# Patient Record
Sex: MALE | Race: WHITE | NOT HISPANIC OR LATINO | Employment: OTHER | URBAN - METROPOLITAN AREA
[De-identification: names, ages, dates, MRNs, and addresses within clinical notes are randomized per-mention and may not be internally consistent; named-entity substitution may affect disease eponyms.]

---

## 2017-01-06 ENCOUNTER — TELEPHONE (OUTPATIENT)
Dept: UROLOGY | Facility: CLINIC | Age: 72
End: 2017-01-06

## 2017-01-06 NOTE — TELEPHONE ENCOUNTER
----- Message from Vania Martinez sent at 1/5/2017  4:18 PM CST -----  Contact: self/[phone]  Patient would like to speak with you about a upcoming procedure he has.  Please call after 2:30.  Please advise

## 2017-01-09 ENCOUNTER — TELEPHONE (OUTPATIENT)
Dept: UROLOGY | Facility: CLINIC | Age: 72
End: 2017-01-09

## 2017-01-09 NOTE — TELEPHONE ENCOUNTER
----- Message from Joana Garzon sent at 1/9/2017  3:47 PM CST -----  Contact: [phone] self  Patient is returning Marleni's call.

## 2017-01-09 NOTE — TELEPHONE ENCOUNTER
----- Message from Vania Martinez sent at 1/6/2017  3:36 PM CST -----  Contact: self/[phone]  Patient is returning your call.  Please advise

## 2017-01-10 ENCOUNTER — ANESTHESIA EVENT (OUTPATIENT)
Dept: SURGERY | Facility: HOSPITAL | Age: 72
End: 2017-01-10
Payer: MEDICARE

## 2017-01-10 ENCOUNTER — HOSPITAL ENCOUNTER (OUTPATIENT)
Dept: PREADMISSION TESTING | Facility: HOSPITAL | Age: 72
Discharge: HOME | End: 2017-01-10
Attending: UROLOGY
Payer: MEDICARE

## 2017-01-10 ENCOUNTER — HOSPITAL ENCOUNTER (OUTPATIENT)
Dept: CARDIOLOGY | Facility: HOSPITAL | Age: 72
Discharge: HOME | End: 2017-01-10
Attending: UROLOGY
Payer: MEDICARE

## 2017-01-10 ENCOUNTER — HOSPITAL ENCOUNTER (OUTPATIENT)
Dept: RADIOLOGY | Facility: HOSPITAL | Age: 72
Discharge: HOME | End: 2017-01-10
Attending: UROLOGY
Payer: MEDICARE

## 2017-01-10 VITALS
RESPIRATION RATE: 18 BRPM | BODY MASS INDEX: 25.2 KG/M2 | SYSTOLIC BLOOD PRESSURE: 133 MMHG | WEIGHT: 180 LBS | DIASTOLIC BLOOD PRESSURE: 85 MMHG | OXYGEN SATURATION: 97 % | HEART RATE: 66 BPM | HEIGHT: 71 IN

## 2017-01-10 DIAGNOSIS — N20.0 NEPHROLITHIASIS: ICD-10-CM

## 2017-01-10 PROCEDURE — 93005 ELECTROCARDIOGRAM TRACING: CPT

## 2017-01-10 PROCEDURE — 71020 XR CHEST PA AND LATERAL: CPT | Mod: TC

## 2017-01-10 PROCEDURE — 71020 XR CHEST PA AND LATERAL: CPT | Mod: 26,,, | Performed by: RADIOLOGY

## 2017-01-10 RX ORDER — SODIUM CHLORIDE 9 MG/ML
INJECTION, SOLUTION INTRAVENOUS CONTINUOUS
Status: CANCELLED | OUTPATIENT
Start: 2017-01-10

## 2017-01-10 RX ORDER — LIDOCAINE HYDROCHLORIDE 10 MG/ML
1 INJECTION, SOLUTION EPIDURAL; INFILTRATION; INTRACAUDAL; PERINEURAL ONCE
Status: CANCELLED | OUTPATIENT
Start: 2017-01-10 | End: 2017-01-10

## 2017-01-10 NOTE — DISCHARGE INSTRUCTIONS
Your surgery is scheduled for 1/25/17.    Please report to Outpatient Surgery Intake Office on the 2nd FLOOR at 6:00 a.m.          INSTRUCTIONS IMPORTANT!!!  ¨ Do not eat or drink after 12 midnight-including water. OK to brush teeth, no   gum, candy or mints!    ¨ Take only these medicines with a small swallow of water-morning of surgery: verapamil         ____  No powder, lotions or creams to surgical area.  ____  Please remove all jewelry, including piercings and leave at home.  ____  No money or valuables needed. Please leave at home.  ____  Please bring any documents given by your doctor.  ____  If going home the same day, arrange for a ride home. You will not be able to             drive if Anesthesia was used.  ____  Wear loose fitting clothing. Allow for dressings, bandages.  ____  Stop Aspirin, Ibuprofen, Motrin and Aleve at least 3-5 days before surgery, unless otherwise instructed by your doctor, or the nurse.   You MAY use Tylenol/acetaminophen until day of surgery.  ____  Wash the surgical area with Hibiclens the night before surgery, and again the             morning of surgery.  Be sure to rinse hibiclens off completely (if instructed by   nurse).  ____  If you take diabetic medication, do not take am of surgery unless instructed by Doctor.  ____  Call MD for temperature above 101 degrees.  ____ Stop taking any Fish Oil supplement or any Vitamins that contain Vitamin E at least 5 days prior to surgery.  ____ Do Not wear your contact lenses the day of your procedure.  You may wear your glasses.        I have read or had read and explained to me, and understand the above information.  Additional comments or instructions:  For additional questions call [phone]     Take a Hibiclens shower twice a day for 3 days prior to surgery, including the morning of surgery.   Gargle with Listerine twice a day for 2 days prior to surgery, including the morning of surgery.      Pre-Op Bathing Instructions    Before  surgery, you can play an important role in your own health.    Because skin is not sterile, we need to be sure that your skin is as free of germs as possible. By following the instructions below, you can reduce the number of germs on your skin before surgery.    IMPORTANT: You will need to shower with a special soap called Hibiclens*, available at any pharmacy.  If you are allergic to Chlorhexidine (the antiseptic in Hibiclens), use an antibacterial soap such as Dial Soap for your preoperative shower.  You will shower with Hibiclens both the night before your surgery and the morning of your surgery.  Do not use Hibiclens on the head, face or genitals to avoid injury to those areas.    STEP #1: THE NIGHT BEFORE YOUR SURGERY     1. Do not shave the area of your body where your surgery will be performed.  2. Shower and wash your hair and body as usual with your normal soap and shampoo.  3. Rinse your hair and body thoroughly after you shower to remove all soap residue.  4. With your hand, apply one packet of Hibiclens soap to the surgical site.   5. Wash the site gently for five (5) minutes. Do not scrub your skin too hard.   6. Do not wash with your regular soap after Hibiclens is used.  7. Rinse your body thoroughly.  8. Pat yourself dry with a clean, soft towel.  9. Do not use lotion, cream, or powder.  10. Wear clean clothes.    STEP #2: THE MORNING OF YOUR SURGERY     1. Repeat Step #1.    * Not to be used by people allergic to Chlorhexidine.            Anesthesia: General Anesthesia  Youre due to have surgery. During surgery, youll be given medication called anesthesia. (It is also called anesthetic.) This will keep you comfortable and pain-free. Your anesthesia provider will use general anesthesia. This sheet tells you more about it.  What is general anesthesia?     You are watched continuously during your procedure by the anesthesia provider   General anesthesia puts you into a state like deep sleep. It goes  into the bloodstream (IV anesthetics), into the lungs (gas anesthetics), or both. You feel nothing during the procedure. You will not remember it. During the procedure, the anesthesia provider monitors you continuously. He or she checks your heart rate and rhythm, blood pressure, breathing, and blood oxygen.  · IV Anesthetics. IV anesthetics are given through an IV line in your arm. Theyre often given first. This is so you are asleep before a gas anesthetic is started. Some kinds of IV anesthetics relieve pain. Others relax you. Your doctor will decide which kind is best in your case.  · Gas Anesthetics. Gas anesthetics are breathed into the lungs. They are often used to keep you asleep. They can be given through a facemask or a tube placed in your larynx or trachea (breathing tube).  ¨ If you have a facemask, your anesthesia provider will most likely place it over your nose and mouth while youre still awake. Youll breathe oxygen through the mask as your IV anesthetic is started. Gas anesthetic may be added through the mask.  ¨ If you have a tube in the larynx or trachea, it will be inserted into your throat after youre asleep.  Anesthesia tools and medications  You will likely have:  · IV anesthetics. These are put into an IV line into your bloodstream.  · Gas anesthetics. You breathe these anesthetics into your lungs, where they pass into your bloodstream.  · Pulse oximeter. This is a small clip that is attached to the end of your finger. This measures your blood oxygen level.  · Electrocardiography leads (electrodes). These are small sticky pads that are placed on your chest. They record your heart rate and rhythm.  · Blood pressure cuff. This reads your blood pressure.  Risks and possible complications  General anesthesia has some risks. These include:  · Breathing problems  · Nausea and vomiting  · Sore throat or hoarseness (usually temporary)  · Allergic reaction to the anesthetic  · Irregular heartbeat  (rare)  · Cardiac arrest (rare)   Anesthesia safety  · Follow all instructions you are given for how long not to eat or drink before your procedure.  · Be sure your doctor knows what medications and drugs you take. This includes over-the-counter medications, herbs, supplements, alcohol or other drugs. You will be asked when those were last taken.  · Have an adult family member or friend drive you home after the procedure.  · For the first 24 hours after your surgery:  ¨ Do not drive or use heavy equipment.  ¨ Have a trusted family member or spouse make important decisions or sign documents.  ¨ Avoid alcohol.  ¨ Have a responsible adult stay with you. He or she can watch for problems and help keep you safe.  © 1938-5142 [employer]. [address] [place] [address], [place] [address]. All rights reserved. This information is not intended as a substitute for professional medical care. Always follow your healthcare professional's instructions.        Treating Kidney Stones: Percutaneous Nephrolithotomy    Percutaneous nephrolithotomy may be done before, after, or instead of other treatments. If you need this procedure, your doctor will discuss its risks and possible complications. You will be told how to prepare. And you will be told about anesthesia that will keep you pain-free during treatment.     An instrument inserted through a viewing tube cracks the stone.    Nephrolithotomy with incision  Percutaneous nephrolithotomy removes larger stones through a small incision in your side. Your doctor places a viewing tube through your incision. The stone is sighted, shattered with a special device if needed, and removed. Afterward, youll briefly have a small soft tube in your incision. This tube carries urine away from your kidney and out of your body.     Pieces of stone are plucked or sucked out through the incision.   Your recovery  You may spend 1 day or 3 days in the hospital. The tube in your side will be  removed during or shortly after your hospital stay. A follow-up visit in 3 months will ensure that your stone is gone. Later visits will help your doctor spot new stones if any form.  When to call your doctor  Contact your doctor right away if you have:  · Sudden pain or flank pain  · A fever over 100.4°F (38°C)  · Nausea that lasts for days  · Heavy bleeding when you urinate or through your drainage tube  · Swelling or redness around your incision   © 8714-5869 [employer]. [address] [place] [address], [place] [address]. All rights reserved. This information is not intended as a substitute for professional medical care. Always follow your healthcare professional's instructions.

## 2017-01-10 NOTE — IP AVS SNAPSHOT
[facility]  [address]  [place] [address]  Phone: [phone]           Patient Discharge Instructions    Our goal is to set you up for success. This packet includes information on your condition, medications, and your home care. It will help you to care for yourself so you don't get sicker.     Please ask your nurse if you have any questions.        There are many details to remember when preparing for your surgery. Here is what you will need to do, please ask your nurse if there are more specific instructions and if you have any questions:    1. 24 hours before procedure Do not smoke or drink alcoholic beverages 24 hours prior to your procedure    2. Eating before procedure Do not eat or drink anything 8 hours before your procedure - this includes gum, mints, and candy.     3. Day of procedure Please remove all jewelry for the procedure. If you wear contact lenses, dentures, hearing aids or glasses, bring a container to put them in during your surgery and give to a family member for safekeeping.  If your doctor has scheduled you for an overnight stay, bring a small overnight bag with any personal items that you need.    4. After procedure Make arrangements in advance for transportation home by a responsible adult. It is not safe to drive a vehicle during the 24 hours following surgery.     PLEASE NOTE: You may be contacted the day before your surgery to confirm your surgery date and arrival time. The Surgery schedule has many variables which may affect the time of your surgery case. Family members should be available if your surgery time changes.                [facility]  Unless otherwise directed by your provider, please contact [facility], our nurse care line that is available for 24/7 assistance.     [phone] (toll-free)    Registered nurses in the [facility] provide clinical advisement, health education, appointment booking, and other advisory services.                     ** Verify the list of medication(s) below is accurate and up to date. Carry this with you in case of emergency. If your medications have changed, please notify your healthcare provider.             Medication List      TAKE these medications        Additional Info                      glipiZIDE 5 MG tablet   Commonly known as:  GLUCOTROL   Refills:  0   Dose:  5 mg    Instructions:  Take 5 mg by mouth daily with breakfast.     Begin Date    AM    Noon    PM    Bedtime       hydrocodone-acetaminophen 5-325mg 5-325 mg per tablet   Commonly known as:  NORCO   Quantity:  30 tablet   Refills:  0   Dose:  1 tablet    Instructions:  Take 1 tablet by mouth every 6 (six) hours as needed for Pain.     Begin Date    AM    Noon    PM    Bedtime       metformin 1000 MG tablet   Commonly known as:  GLUCOPHAGE   Refills:  0      Begin Date    AM    Noon    PM    Bedtime       ONE DAILY MULTIVITAMIN per tablet   Refills:  0   Dose:  1 tablet   Generic drug:  multivitamin    Instructions:  Take 1 tablet by mouth once daily.     Begin Date    AM    Noon    PM    Bedtime       pioglitazone 45 MG tablet   Commonly known as:  ACTOS   Refills:  0   Dose:  45 mg    Instructions:  Take 45 mg by mouth once daily.     Begin Date    AM    Noon    PM    Bedtime       pravastatin 40 MG tablet   Commonly known as:  PRAVACHOL   Refills:  0      Begin Date    AM    Noon    PM    Bedtime       PREVNAR 13 (PF) 0.5 mL Syrg   Refills:  0   Generic drug:  pneumoc 13-sukhdev conj-dip cr(PF)    Instructions:  TO BE ADMINISTERED BY PHARMACIST FOR IMMUNIZATION     Begin Date    AM    Noon    PM    Bedtime       verapamil 120 MG CR tablet   Commonly known as:  CALAN-SR   Quantity:  90 tablet   Refills:  3   Dose:  120 mg    Instructions:  Take 1 tablet (120 mg total) by mouth once daily.     Begin Date    AM    Noon    PM    Bedtime       warfarin 5 MG tablet   Commonly known as:  COUMADIN   Quantity:  90 tablet   Refills:  3    Instructions:   TAKE ONE TABLET BY MOUTH ONCE DAILY     Begin Date    AM    Noon    PM    Bedtime                  Please bring to all follow up appointments:    1. A copy of your discharge instructions.  2. All medicines you are currently taking in their original bottles.  3. Identification and insurance card.    Please arrive 15 minutes ahead of scheduled appointment time.    Please call 24 hours in advance if you must reschedule your appointment and/or time.        Your Scheduled Appointments     Henry 10, 2017  9:00 AM CST   Pre-Admit Testing Visit with [facility]    [address] [place] [address]   [phone]            Jan 25, 2017  8:00 AM CST   Ir Dosc with [facility] IR[facility]   [facility]    [address]  [place] [id number] [phone]            May 09, 2017  8:00 AM CDT   Follow Up with Omar Lantigua MD   [facility] ([address]  [address] [place] [id number] [phone]              Your Future Surgeries/Procedures     Jan 25, 2017   Surgery with Dosc Diagnostic Provider   [facility]    [address]  [place] [address] [phone]            Jan 25, 2017   Surgery with Glenn Schuster MD   [facility]    [address] [place] [id number] [phone]                  Discharge Instructions       Your surgery is scheduled for 1/25/17.    Please report to Outpatient Surgery Intake Office on the 2nd FLOOR at 6:00 a.m.          INSTRUCTIONS IMPORTANT!!!  ¨ Do not eat or drink after 12 midnight-including water. OK to brush teeth, no   gum, candy or mints!    ¨ Take only these medicines with a small swallow of water-morning of surgery: verapamil         ____  No powder, lotions or creams to surgical area.  ____  Please remove all jewelry, including  piercings and leave at home.  ____  No money or valuables needed. Please leave at home.  ____  Please bring any documents given by your doctor.  ____  If going home the same day, arrange for a ride home. You will not be able to             drive if Anesthesia was used.  ____  Wear loose fitting clothing. Allow for dressings, bandages.  ____  Stop Aspirin, Ibuprofen, Motrin and Aleve at least 3-5 days before surgery, unless otherwise instructed by your doctor, or the nurse.   You MAY use Tylenol/acetaminophen until day of surgery.  ____  Wash the surgical area with Hibiclens the night before surgery, and again the             morning of surgery.  Be sure to rinse hibiclens off completely (if instructed by   nurse).  ____  If you take diabetic medication, do not take am of surgery unless instructed by Doctor.  ____  Call MD for temperature above 101 degrees.  ____ Stop taking any Fish Oil supplement or any Vitamins that contain Vitamin E at least 5 days prior to surgery.  ____ Do Not wear your contact lenses the day of your procedure.  You may wear your glasses.        I have read or had read and explained to me, and understand the above information.  Additional comments or instructions:  For additional questions call [phone]     Take a Hibiclens shower twice a day for 3 days prior to surgery, including the morning of surgery.   Gargle with Listerine twice a day for 2 days prior to surgery, including the morning of surgery.      Pre-Op Bathing Instructions    Before surgery, you can play an important role in your own health.    Because skin is not sterile, we need to be sure that your skin is as free of germs as possible. By following the instructions below, you can reduce the number of germs on your skin before surgery.    IMPORTANT: You will need to shower with a special soap called Hibiclens*, available at any pharmacy.  If you are allergic to Chlorhexidine (the antiseptic in Hibiclens), use an antibacterial  soap such as Dial Soap for your preoperative shower.  You will shower with Hibiclens both the night before your surgery and the morning of your surgery.  Do not use Hibiclens on the head, face or genitals to avoid injury to those areas.    STEP #1: THE NIGHT BEFORE YOUR SURGERY     1. Do not shave the area of your body where your surgery will be performed.  2. Shower and wash your hair and body as usual with your normal soap and shampoo.  3. Rinse your hair and body thoroughly after you shower to remove all soap residue.  4. With your hand, apply one packet of Hibiclens soap to the surgical site.   5. Wash the site gently for five (5) minutes. Do not scrub your skin too hard.   6. Do not wash with your regular soap after Hibiclens is used.  7. Rinse your body thoroughly.  8. Pat yourself dry with a clean, soft towel.  9. Do not use lotion, cream, or powder.  10. Wear clean clothes.    STEP #2: THE MORNING OF YOUR SURGERY     1. Repeat Step #1.    * Not to be used by people allergic to Chlorhexidine.            Anesthesia: General Anesthesia  Youre due to have surgery. During surgery, youll be given medication called anesthesia. (It is also called anesthetic.) This will keep you comfortable and pain-free. Your anesthesia provider will use general anesthesia. This sheet tells you more about it.  What is general anesthesia?     You are watched continuously during your procedure by the anesthesia provider   General anesthesia puts you into a state like deep sleep. It goes into the bloodstream (IV anesthetics), into the lungs (gas anesthetics), or both. You feel nothing during the procedure. You will not remember it. During the procedure, the anesthesia provider monitors you continuously. He or she checks your heart rate and rhythm, blood pressure, breathing, and blood oxygen.  · IV Anesthetics. IV anesthetics are given through an IV line in your arm. Theyre often given first. This is so you are asleep before a gas  anesthetic is started. Some kinds of IV anesthetics relieve pain. Others relax you. Your doctor will decide which kind is best in your case.  · Gas Anesthetics. Gas anesthetics are breathed into the lungs. They are often used to keep you asleep. They can be given through a facemask or a tube placed in your larynx or trachea (breathing tube).  ¨ If you have a facemask, your anesthesia provider will most likely place it over your nose and mouth while youre still awake. Youll breathe oxygen through the mask as your IV anesthetic is started. Gas anesthetic may be added through the mask.  ¨ If you have a tube in the larynx or trachea, it will be inserted into your throat after youre asleep.  Anesthesia tools and medications  You will likely have:  · IV anesthetics. These are put into an IV line into your bloodstream.  · Gas anesthetics. You breathe these anesthetics into your lungs, where they pass into your bloodstream.  · Pulse oximeter. This is a small clip that is attached to the end of your finger. This measures your blood oxygen level.  · Electrocardiography leads (electrodes). These are small sticky pads that are placed on your chest. They record your heart rate and rhythm.  · Blood pressure cuff. This reads your blood pressure.  Risks and possible complications  General anesthesia has some risks. These include:  · Breathing problems  · Nausea and vomiting  · Sore throat or hoarseness (usually temporary)  · Allergic reaction to the anesthetic  · Irregular heartbeat (rare)  · Cardiac arrest (rare)   Anesthesia safety  · Follow all instructions you are given for how long not to eat or drink before your procedure.  · Be sure your doctor knows what medications and drugs you take. This includes over-the-counter medications, herbs, supplements, alcohol or other drugs. You will be asked when those were last taken.  · Have an adult family member or friend drive you home after the procedure.  · For the first 24 hours  after your surgery:  ¨ Do not drive or use heavy equipment.  ¨ Have a trusted family member or spouse make important decisions or sign documents.  ¨ Avoid alcohol.  ¨ Have a responsible adult stay with you. He or she can watch for problems and help keep you safe.  © 8233-3430 [employer]. [address] [place] [address]. All rights reserved. This information is not intended as a substitute for professional medical care. Always follow your healthcare professional's instructions.        Treating Kidney Stones: Percutaneous Nephrolithotomy    Percutaneous nephrolithotomy may be done before, after, or instead of other treatments. If you need this procedure, your doctor will discuss its risks and possible complications. You will be told how to prepare. And you will be told about anesthesia that will keep you pain-free during treatment.     An instrument inserted through a viewing tube cracks the stone.    Nephrolithotomy with incision  Percutaneous nephrolithotomy removes larger stones through a small incision in your side. Your doctor places a viewing tube through your incision. The stone is sighted, shattered with a special device if needed, and removed. Afterward, youll briefly have a small soft tube in your incision. This tube carries urine away from your kidney and out of your body.     Pieces of stone are plucked or sucked out through the incision.   Your recovery  You may spend 1 day or 3 days in the hospital. The tube in your side will be removed during or shortly after your hospital stay. A follow-up visit in 3 months will ensure that your stone is gone. Later visits will help your doctor spot new stones if any form.  When to call your doctor  Contact your doctor right away if you have:  · Sudden pain or flank pain  · A fever over 100.4°F (38°C)  · Nausea that lasts for days  · Heavy bleeding when you urinate or through your drainage tube  · Swelling or redness around your incision   ©  9001-8928 [employer]. [address] [place] [address]. All rights reserved. This information is not intended as a substitute for professional medical care. Always follow your healthcare professional's instructions.            Admission Information     Date & Time Provider Department CSN    1/10/2017  9:00 AM Glenn Schuster MD [facility] [id number]      Care Providers     Provider Role Specialty Primary office phone    Glenn Schuster MD Attending Provider Urology [phone]      Recent Lab Values        9/27/2011                          11:40 AM           A1C 9.1 (H)                       Allergies as of 1/10/2017        Reactions    Shellfish Containing Products Other (See Comments)    GOUT      Advance Directives     An advance directive is a document which, in the event you are no longer able to make decisions for yourself, tells your healthcare team what kind of treatment you do or do not want to receive, or who you would like to make those decisions for you.  If you do not currently have an advance directive, [facility] encourages you to create one.  For more information call:  [phone]), [phone] ([phone]),  or log on to [email or web address].        Smoking Cessation     If you would like to quit smoking:   You may be eligible for free services if you are a [place] resident and started smoking cigarettes before September 1, 1988.  Call the Smoking Cessation Trust (SCT) toll free at (687) 380-9356 or (881) 724-5047.   Call 4-800-QUIT-NOW if you do not meet the above criteria.            Language Assistance Services     ATTENTION: Language assistance services are available, free of charge. Please call [phone].      ATENCIÓN: Si habla español, tiene a harding disposición servicios gratuitos de asistencia lingüística. Llame al [phone].     CHÚ Ý: N?u b?n nói Ti?ng Vi?t, có các d?ch v? h? tr? ngôn ng? mi?n phí dành cho b?n.  G?i s? [phone].        Coumadin Discharge Instructions                         Diabetes Discharge Instructions                                    [facility] complies with applicable Federal civil rights laws and does not discriminate on the basis of race, color, national origin, age, disability, or sex.

## 2017-01-10 NOTE — ANESTHESIA PREPROCEDURE EVALUATION
01/10/2017     Cesar Oliveira is a 71 y.o., male is scheduled for right nephrostomy tube and percutaneous nephrolithotripsy and ureteroscopic stone extraction under [facility] on 2/8/2017.    Past Surgical History   Procedure Laterality Date    Throat surgery  2000     for sleep apnea     Uvulectomy  2000    Extracorporeal shock wave lithotripsy  05/2016         [facility] Anesthesia Evaluation    I have reviewed the Patient Summary Reports.    I have reviewed the Nursing Notes.   I have reviewed the Medications.     Review of Systems  Anesthesia Hx:  Hx of Anesthetic complications  History of prior surgery of interest to airway management or planning: Previous anesthesia: General Airway issues documented on chart review include GETA  Denies Family Hx of Anesthesia complications.  Personal Hx of Anesthesia complications (2016-05-18 rocuronium hang-over (marked incomplete reversal 1h after 10mg increment =>delay to leave OR; no Sugammadex available yet))   Social:  Smoker, Alcohol Use  Tobacco Use: Active smoker of cigarette, 1 pack per day, Smoking Cessation discussion.   Hematology/Oncology:        Hematology Comments: On coumadin; per Dr. Lantigua pt will stop coumadin 1/20/2017. Pt will restart post procedure per Dr. JESSICA Schuster. Pt verbalizes understanding   EENT/Dental:EENT/Dental Normal   Cardiovascular:   Exercise tolerance: poor Hypertension, well controlled Dysrhythmias (on coumadin; will hold 1/20/2017) atrial fibrillation Denies Angina.     hyperlipidemia MARSH ECG has been reviewed.    Pulmonary:   Denies Shortness of breath.    Renal/:   renal calculi Denies pain; no hematuria   Hepatic/GI:   GERD, well controlled    Neurological:  Neurology Normal    Endocrine:   Diabetes, well controlled, type 2  Diabetes, Type 2 Diabetes , controlled by oral hypoglycemics. , most recent HgA1c value was 7.8 on doesn't  "recall date in 2016.                Physical Exam  General:  Well nourished    Airway/Jaw/Neck:  Airway Findings: Mouth Opening: Normal Tongue: Normal  General Airway Assessment: Adult  Mallampati: II  Improves to II with phonation.  TM Distance: Normal, at least 6 cm        Eyes/Ears/Nose:  EYES/EARS/NOSE FINDINGS: Normal   Dental:  Dental Findings: Periodontal disease, Mild, upper partial dentures, lower partial dentures   Chest/Lungs:  Chest/Lungs Clear    Heart/Vascular:  Heart Findings: Rhythm: Irregularly Irregular  Sounds: Normal, Quiet  Heart murmur: negative    Abdomen:  Abdomen Findings: Normal      Mental Status:  Mental Status Findings: Normal      Cardiac treadmill stress test 4/2016 (report in media section EPIC)  Per report:  "poor physical performance; normal stress test."      Chemistry        Component Value Date/Time     01/10/2017 0734    K 4.3 01/10/2017 0734     01/10/2017 0734    CO2 26 01/10/2017 0734    BUN 11 01/10/2017 0734    CREATININE 1.1 01/10/2017 0734     (H) 01/10/2017 0734        Component Value Date/Time    CALCIUM 9.5 01/10/2017 0734    ALKPHOS 96 01/10/2017 0734    AST 16 01/10/2017 0734    ALT 13 01/10/2017 0734    BILITOT 0.5 01/10/2017 0734        Lab Results   Component Value Date    WBC 9.67 01/10/2017    HGB 17.2 01/10/2017    HCT 51.3 01/10/2017    MCV 95 01/10/2017     01/10/2017         Anesthesia Plan  Type of Anesthesia, risks & benefits discussed:  Anesthesia Type:  general  Patient's Preference:   Intra-op Monitoring Plan:   Intra-op Monitoring Plan Comments:   Post Op Pain Control Plan:   Post Op Pain Control Plan Comments:   Induction:   IV  Beta Blocker:  Patient is not currently on a Beta-Blocker (No further documentation required).       Informed Consent: Patient understands risks and agrees with Anesthesia plan.  Questions answered. Anesthesia consent signed with patient.  ASA Score: 3     Day of Surgery Review of History & Physical: " I have interviewed and examined the patient. I have reviewed the patient's H&P dated:  There are no significant changes.  H&P update referred to the surgeon.     Anesthesia Plan Notes:           Ready For Surgery From Anesthesia Perspective.

## 2017-01-10 NOTE — IP AVS SNAPSHOT
[facility]  [address]  [place] [address]  Phone: [phone]           I have received a copy of my After Visit Summary and discharge instructions from [facility].    INSTRUCTIONS RECEIVED AND UNDERSTOOD BY:                     Patient/Patient Representative: ________________________________________________________________     Date/Time: ________________________________________________________________                     Instructions Given By: ________________________________________________________________     Date/Time: ________________________________________________________________

## 2017-01-16 ENCOUNTER — TELEPHONE (OUTPATIENT)
Dept: UROLOGY | Facility: CLINIC | Age: 72
End: 2017-01-16

## 2017-01-16 NOTE — TELEPHONE ENCOUNTER
----- Message from Yanique Esqueda sent at 1/16/2017  2:57 PM CST -----  Patient no. [phone]     Patient would like to speak to you regarding his operation.   Please call.

## 2017-01-17 NOTE — TELEPHONE ENCOUNTER
----- Message from Jerrica Braden MA sent at 1/17/2017 10:02 AM CST -----  Contact: [phone]  Patient had recent surgery, states passing blood this morning, want's to know if he need to come in. Please advise

## 2017-02-03 ENCOUNTER — TELEPHONE (OUTPATIENT)
Dept: UROLOGY | Facility: CLINIC | Age: 72
End: 2017-02-03

## 2017-02-03 NOTE — TELEPHONE ENCOUNTER
Called numerous times today and this afternoon.  Patient did not answer. Message left to call back on Monday.

## 2017-02-03 NOTE — TELEPHONE ENCOUNTER
----- Message from Nayely Ledesma sent at 2/3/2017  2:16 PM CST -----  Contact: [phone]/self  Pt states he was speaking to Dr. Schuster and the phone disconnected he would like a call back.  Please advise

## 2017-02-06 ENCOUNTER — TELEPHONE (OUTPATIENT)
Dept: UROLOGY | Facility: CLINIC | Age: 72
End: 2017-02-06

## 2017-02-06 NOTE — TELEPHONE ENCOUNTER
Called again and left a message.  Does he have a specific question for me?  I do not have anything significant or specific to discuss, but if he keeps calling, I will keep trying to reach him.

## 2017-02-06 NOTE — TELEPHONE ENCOUNTER
----- Message from Yanique Esqueda sent at 2/6/2017 12:26 PM CST -----  Patient no.  [phone]    While patient was speaking to Dr. Schuster on Friday about his surgery, the call disconnected.  He would like to speak to Dr. Schuster.

## 2017-02-07 ENCOUNTER — ANESTHESIA EVENT (OUTPATIENT)
Dept: SURGERY | Facility: HOSPITAL | Age: 72
End: 2017-02-07
Payer: MEDICARE

## 2017-02-07 NOTE — ANESTHESIA PREPROCEDURE EVALUATION
02/07/2017     Cesar Oliveira is a 71 y.o., male is scheduled for right nephrostomy tube and percutaneous nephrolithotripsy and ureteroscopic stone extraction under MAC/GETA on 2/8/2017.    Past Surgical History   Procedure Laterality Date    Throat surgery  2000     for sleep apnea     Uvulectomy  2000    Extracorporeal shock wave lithotripsy  05/2016         [facility] Anesthesia Evaluation    I have reviewed the Patient Summary Reports.    I have reviewed the Nursing Notes.   I have reviewed the Medications.     Review of Systems  Anesthesia Hx:  Hx of Anesthetic complications  History of prior surgery of interest to airway management or planning: Previous anesthesia: General Airway issues documented on chart review include GETA  Denies Family Hx of Anesthesia complications.  Personal Hx of Anesthesia complications (2016-05-18 rocuronium hang-over (marked incomplete reversal 1h after 10mg increment =>delay to leave OR; no Sugammadex available yet))   Social:  Smoker, Alcohol Use  Tobacco Use: Active smoker of cigarette, 1 pack per day, Smoking Cessation discussion.   Hematology/Oncology:        Hematology Comments: On coumadin; per Dr. Lantigua pt will stop coumadin 1/20/2017. Pt will restart post procedure per Dr. JESSICA Schuster. Pt verbalizes understanding   EENT/Dental:EENT/Dental Normal   Cardiovascular:   Exercise tolerance: poor Hypertension, well controlled Dysrhythmias (on coumadin; will hold 1/20/2017) atrial fibrillation Denies Angina.     hyperlipidemia MARSH ECG has been reviewed.    Pulmonary:   Denies Shortness of breath.    Renal/:   renal calculi Denies pain; no hematuria   Hepatic/GI:   GERD, well controlled    Neurological:  Neurology Normal    Endocrine:   Diabetes, well controlled, type 2  Diabetes, Type 2 Diabetes , controlled by oral hypoglycemics. , most recent HgA1c value was 7.8 on doesn't  "recall date in 2016.                Physical Exam  General:  Well nourished    Airway/Jaw/Neck:  Airway Findings: Mouth Opening: Normal Tongue: Normal  General Airway Assessment: Adult  Mallampati: II  Improves to II with phonation.  TM Distance: Normal, at least 6 cm        Eyes/Ears/Nose:  EYES/EARS/NOSE FINDINGS: Normal   Dental:  Dental Findings: Periodontal disease, Mild, upper partial dentures, lower partial dentures   Chest/Lungs:  Chest/Lungs Clear    Heart/Vascular:  Heart Findings: Rhythm: Irregularly Irregular  Sounds: Normal, Quiet  Heart murmur: negative    Abdomen:  Abdomen Findings: Normal      Mental Status:  Mental Status Findings: Normal      Cardiac treadmill stress test 4/2016 (report in media section EPIC)  Per report:  "poor physical performance; normal stress test."      Chemistry        Component Value Date/Time     01/10/2017 0734    K 4.3 01/10/2017 0734     01/10/2017 0734    CO2 26 01/10/2017 0734    BUN 11 01/10/2017 0734    CREATININE 1.1 01/10/2017 0734     (H) 01/10/2017 0734        Component Value Date/Time    CALCIUM 9.5 01/10/2017 0734    ALKPHOS 96 01/10/2017 0734    AST 16 01/10/2017 0734    ALT 13 01/10/2017 0734    BILITOT 0.5 01/10/2017 0734        Lab Results   Component Value Date    WBC 9.67 01/10/2017    HGB 17.2 01/10/2017    HCT 51.3 01/10/2017    MCV 95 01/10/2017     01/10/2017         Anesthesia Plan  Type of Anesthesia, risks & benefits discussed:  Anesthesia Type:  general  Patient's Preference:   Intra-op Monitoring Plan:   Intra-op Monitoring Plan Comments:   Post Op Pain Control Plan:   Post Op Pain Control Plan Comments:   Induction:   IV  Beta Blocker:  Patient is not currently on a Beta-Blocker (No further documentation required).       Informed Consent: Patient understands risks and agrees with Anesthesia plan.  Questions answered. Anesthesia consent signed with patient.  ASA Score: 3     Day of Surgery Review of History & Physical: " I have interviewed and examined the patient. I have reviewed the patient's H&P dated:  There are no significant changes.  H&P update referred to the surgeon.     Anesthesia Plan Notes:           Ready For Surgery From Anesthesia Perspective.

## 2017-02-08 ENCOUNTER — SURGERY (OUTPATIENT)
Age: 72
End: 2017-02-08

## 2017-02-08 ENCOUNTER — ANESTHESIA (OUTPATIENT)
Dept: SURGERY | Facility: HOSPITAL | Age: 72
End: 2017-02-08
Payer: MEDICARE

## 2017-02-08 VITALS — HEART RATE: 91 BPM | DIASTOLIC BLOOD PRESSURE: 55 MMHG | SYSTOLIC BLOOD PRESSURE: 94 MMHG | OXYGEN SATURATION: 99 %

## 2017-02-08 PROCEDURE — 63600175 PHARM REV CODE 636 W HCPCS: Performed by: UROLOGY

## 2017-02-08 PROCEDURE — 63600175 PHARM REV CODE 636 W HCPCS: Performed by: NURSE ANESTHETIST, CERTIFIED REGISTERED

## 2017-02-08 PROCEDURE — 25000003 PHARM REV CODE 250: Performed by: NURSE ANESTHETIST, CERTIFIED REGISTERED

## 2017-02-08 PROCEDURE — 25000003 PHARM REV CODE 250: Performed by: UROLOGY

## 2017-02-08 RX ORDER — PROPOFOL 10 MG/ML
VIAL (ML) INTRAVENOUS
Status: DISCONTINUED | OUTPATIENT
Start: 2017-02-08 | End: 2017-02-08

## 2017-02-08 RX ORDER — SUCCINYLCHOLINE CHLORIDE 20 MG/ML
INJECTION INTRAMUSCULAR; INTRAVENOUS
Status: DISCONTINUED | OUTPATIENT
Start: 2017-02-08 | End: 2017-02-08

## 2017-02-08 RX ORDER — ONDANSETRON HYDROCHLORIDE 2 MG/ML
INJECTION, SOLUTION INTRAMUSCULAR; INTRAVENOUS
Status: DISCONTINUED | OUTPATIENT
Start: 2017-02-08 | End: 2017-02-08

## 2017-02-08 RX ORDER — LIDOCAINE HCL/PF 100 MG/5ML
SYRINGE (ML) INTRAVENOUS
Status: DISCONTINUED | OUTPATIENT
Start: 2017-02-08 | End: 2017-02-08

## 2017-02-08 RX ORDER — PROPOFOL 10 MG/ML
VIAL (ML) INTRAVENOUS CONTINUOUS PRN
Status: DISCONTINUED | OUTPATIENT
Start: 2017-02-08 | End: 2017-02-08

## 2017-02-08 RX ORDER — PHENYLEPHRINE HYDROCHLORIDE 10 MG/ML
INJECTION INTRAVENOUS
Status: DISCONTINUED | OUTPATIENT
Start: 2017-02-08 | End: 2017-02-08

## 2017-02-08 RX ORDER — ESMOLOL HYDROCHLORIDE 10 MG/ML
INJECTION INTRAVENOUS
Status: DISCONTINUED | OUTPATIENT
Start: 2017-02-08 | End: 2017-02-08

## 2017-02-08 RX ORDER — FENTANYL CITRATE 50 UG/ML
INJECTION, SOLUTION INTRAMUSCULAR; INTRAVENOUS
Status: DISCONTINUED | OUTPATIENT
Start: 2017-02-08 | End: 2017-02-08

## 2017-02-08 RX ORDER — MIDAZOLAM HYDROCHLORIDE 1 MG/ML
INJECTION INTRAMUSCULAR; INTRAVENOUS
Status: DISCONTINUED | OUTPATIENT
Start: 2017-02-08 | End: 2017-02-08

## 2017-02-08 RX ORDER — ROCURONIUM BROMIDE 10 MG/ML
INJECTION, SOLUTION INTRAVENOUS
Status: DISCONTINUED | OUTPATIENT
Start: 2017-02-08 | End: 2017-02-08

## 2017-02-08 RX ORDER — SODIUM CHLORIDE, SODIUM LACTATE, POTASSIUM CHLORIDE, CALCIUM CHLORIDE 600; 310; 30; 20 MG/100ML; MG/100ML; MG/100ML; MG/100ML
INJECTION, SOLUTION INTRAVENOUS CONTINUOUS PRN
Status: DISCONTINUED | OUTPATIENT
Start: 2017-02-08 | End: 2017-02-08

## 2017-02-08 RX ADMIN — LIDOCAINE HYDROCHLORIDE 60 MG: 20 INJECTION, SOLUTION INTRAVENOUS at 10:02

## 2017-02-08 RX ADMIN — FENTANYL CITRATE 50 MCG: 50 INJECTION, SOLUTION INTRAMUSCULAR; INTRAVENOUS at 11:02

## 2017-02-08 RX ADMIN — SODIUM CHLORIDE, SODIUM LACTATE, POTASSIUM CHLORIDE, AND CALCIUM CHLORIDE: .6; .31; .03; .02 INJECTION, SOLUTION INTRAVENOUS at 08:02

## 2017-02-08 RX ADMIN — PROPOFOL 150 MCG/KG/MIN: 10 INJECTION, EMULSION INTRAVENOUS at 08:02

## 2017-02-08 RX ADMIN — ESMOLOL HYDROCHLORIDE 20 MG: 10 INJECTION, SOLUTION INTRAVENOUS at 11:02

## 2017-02-08 RX ADMIN — PHENYLEPHRINE HYDROCHLORIDE 100 MCG: 10 INJECTION INTRAVENOUS at 11:02

## 2017-02-08 RX ADMIN — PROPOFOL 110 MG: 10 INJECTION, EMULSION INTRAVENOUS at 10:02

## 2017-02-08 RX ADMIN — SODIUM CHLORIDE, SODIUM LACTATE, POTASSIUM CHLORIDE, AND CALCIUM CHLORIDE: .6; .31; .03; .02 INJECTION, SOLUTION INTRAVENOUS at 09:02

## 2017-02-08 RX ADMIN — PROPOFOL 60 MG: 10 INJECTION, EMULSION INTRAVENOUS at 08:02

## 2017-02-08 RX ADMIN — ROCURONIUM BROMIDE 5 MG: 10 INJECTION, SOLUTION INTRAVENOUS at 10:02

## 2017-02-08 RX ADMIN — CEFTRIAXONE SODIUM 1 G: 1 INJECTION, POWDER, FOR SOLUTION INTRAMUSCULAR; INTRAVENOUS at 08:02

## 2017-02-08 RX ADMIN — IOHEXOL 100 ML: 300 INJECTION, SOLUTION INTRAVENOUS at 10:02

## 2017-02-08 RX ADMIN — GENTAMICIN SULFATE 164 MG: 40 INJECTION, SOLUTION INTRAMUSCULAR; INTRAVENOUS at 08:02

## 2017-02-08 RX ADMIN — LIDOCAINE HYDROCHLORIDE 50 MG: 20 INJECTION, SOLUTION INTRAVENOUS at 08:02

## 2017-02-08 RX ADMIN — FENTANYL CITRATE 50 MCG: 50 INJECTION, SOLUTION INTRAMUSCULAR; INTRAVENOUS at 10:02

## 2017-02-08 RX ADMIN — MIDAZOLAM HYDROCHLORIDE 2 MG: 1 INJECTION, SOLUTION INTRAMUSCULAR; INTRAVENOUS at 08:02

## 2017-02-08 RX ADMIN — ONDANSETRON 4 MG: 2 INJECTION, SOLUTION INTRAMUSCULAR; INTRAVENOUS at 11:02

## 2017-02-08 RX ADMIN — PROPOFOL 50 MG: 10 INJECTION, EMULSION INTRAVENOUS at 11:02

## 2017-02-08 RX ADMIN — PHENYLEPHRINE HYDROCHLORIDE 200 MCG: 10 INJECTION INTRAVENOUS at 11:02

## 2017-02-08 RX ADMIN — SUCCINYLCHOLINE CHLORIDE 100 MG: 20 INJECTION, SOLUTION INTRAMUSCULAR; INTRAVENOUS at 10:02

## 2017-02-08 RX ADMIN — ONDANSETRON 4 MG: 2 INJECTION, SOLUTION INTRAMUSCULAR; INTRAVENOUS at 10:02

## 2017-02-08 NOTE — TRANSFER OF CARE
"Anesthesia Transfer of Care Note    Patient: Cesar Oliveira    Procedure(s) Performed: Procedure(s) (LRB):  NEPHROLITHOTRIPSY-PERCUTANEOUS (Right)  EXTRACTION-STONE-URETEROSCOPY (Right)  PYELOGRAM-ANTEGRADE    Patient location: PACU    Anesthesia Type: general    Transport from OR: Transported from OR on 6-10 L/min O2 by face mask with adequate spontaneous ventilation    Post pain: adequate analgesia    Post assessment: no apparent anesthetic complications    Post vital signs: stable    Level of consciousness: awake    Nausea/Vomiting: no nausea/vomiting    Complications: none          Last vitals:   Visit Vitals    BP (!) 144/96 (BP Location: Left arm, Patient Position: Lying, BP Method: Automatic)    Pulse 84    Temp 36.1 °C (97 °F) (Oral)    Resp 12    Ht 5' 11" (1.803 m)    Wt 81.6 kg (180 lb)    SpO2 99%    BMI 25.1 kg/m2     "

## 2017-02-08 NOTE — TRANSFER OF CARE
"Anesthesia Transfer of Care Note    Patient: Cesar Oliveira    Procedure(s) Performed: Procedure(s) (LRB):  [id number] TUBE (N/A)    Patient location: Other: OR    Anesthesia Type: MAC    Transport from OR: Transported from OR on room air with adequate spontaneous ventilation    Post pain: adequate analgesia    Post assessment: no apparent anesthetic complications    Post vital signs: stable    Level of consciousness: awake and alert    Nausea/Vomiting: no nausea/vomiting    Complications: none    Comments: Pt was a MAC for procedure in IR, transported to the OR, report given to ONESIMO Neri.       Last vitals:   Visit Vitals    BP (!) 144/96 (BP Location: Left arm, Patient Position: Lying, BP Method: Automatic)    Pulse 89    Temp 36.4 °C (97.6 °F) (Oral)    Resp 20    Ht 5' 11" (1.803 m)    Wt 81.6 kg (180 lb)    SpO2 (!) 94%    BMI 25.1 kg/m2     "

## 2017-02-09 ENCOUNTER — TELEPHONE (OUTPATIENT)
Dept: UROLOGY | Facility: CLINIC | Age: 72
End: 2017-02-09

## 2017-02-09 DIAGNOSIS — N20.0 CALCULUS, RENAL: Primary | ICD-10-CM

## 2017-02-09 NOTE — ANESTHESIA POSTPROCEDURE EVALUATION
"Anesthesia Post Evaluation    Patient: Cesar Oliveira    Procedure(s) Performed: Procedure(s) (LRB):  NEPHROLITHOTRIPSY-PERCUTANEOUS (Right)  EXTRACTION-STONE-URETEROSCOPY (Right)  NEPHROSTOGRAM - ANTEGRADE  NEPHROSCOPY-PERCUTANEOUS    Final Anesthesia Type: general  Patient location during evaluation: PACU  Patient participation: Yes- Able to Participate  Level of consciousness: awake and alert  Post-procedure vital signs: reviewed and stable  Pain management: adequate  Airway patency: patent  PONV status at discharge: No PONV  Anesthetic complications: no      Cardiovascular status: hemodynamically stable  Respiratory status: unassisted  Hydration status: euvolemic  Follow-up not needed.        Visit Vitals    /61    Pulse 86    Temp 36.1 °C (97 °F) (Oral)    Resp 15    Ht 5' 11" (1.803 m)    Wt 81.6 kg (180 lb)    SpO2 (!) 94%    BMI 25.1 kg/m2       Pain/Neli Score: Pain Assessment Performed: Yes (2/8/2017  5:05 PM)  Presence of Pain: denies (2/8/2017  5:05 PM)  Pain Rating Prior to Med Admin: 6 (2/8/2017  1:15 PM)  Neli Score: 8 (2/8/2017  5:05 PM)      "

## 2017-02-09 NOTE — ANESTHESIA POSTPROCEDURE EVALUATION
"Anesthesia Post Evaluation    Patient: Cesar Oliveira    Procedure(s) Performed: Procedure(s) (LRB):  [id number] TUBE (N/A)    Final Anesthesia Type: MAC  Patient location during evaluation: PACU  Patient participation: Yes- Able to Participate  Level of consciousness: awake and alert  Post-procedure vital signs: reviewed and stable  Pain management: adequate  Airway patency: patent  PONV status at discharge: No PONV  Anesthetic complications: no      Cardiovascular status: hemodynamically stable  Respiratory status: unassisted  Hydration status: euvolemic  Follow-up not needed.        Visit Vitals    /61    Pulse 86    Temp 36.1 °C (97 °F) (Oral)    Resp 15    Ht 5' 11" (1.803 m)    Wt 81.6 kg (180 lb)    SpO2 (!) 94%    BMI 25.1 kg/m2       Pain/Neli Score: Pain Assessment Performed: Yes (2/8/2017  5:05 PM)  Presence of Pain: denies (2/8/2017  5:05 PM)  Pain Rating Prior to Med Admin: 6 (2/8/2017  1:15 PM)  Neli Score: 8 (2/8/2017  5:05 PM)      "

## 2017-02-09 NOTE — TELEPHONE ENCOUNTER
----- Message from Jolly Herrera sent at 2/9/2017 12:16 PM CST -----  Contact: [phone]  Nurse wale from the hospital called and states the  Wants to see patient on 02-14-17

## 2017-02-13 ENCOUNTER — HOSPITAL ENCOUNTER (EMERGENCY)
Facility: HOSPITAL | Age: 72
Discharge: HOME | End: 2017-02-13
Attending: EMERGENCY MEDICINE
Payer: MEDICARE

## 2017-02-13 ENCOUNTER — TELEPHONE (OUTPATIENT)
Dept: UROLOGY | Facility: CLINIC | Age: 72
End: 2017-02-13

## 2017-02-13 VITALS
TEMPERATURE: 98 F | SYSTOLIC BLOOD PRESSURE: 111 MMHG | OXYGEN SATURATION: 96 % | RESPIRATION RATE: 18 BRPM | HEIGHT: 71 IN | HEART RATE: 86 BPM | WEIGHT: 182 LBS | DIASTOLIC BLOOD PRESSURE: 70 MMHG | BODY MASS INDEX: 25.48 KG/M2

## 2017-02-13 DIAGNOSIS — N39.0 URINARY TRACT INFECTION WITH HEMATURIA, SITE UNSPECIFIED: ICD-10-CM

## 2017-02-13 DIAGNOSIS — G89.18 POST-OP PAIN: Primary | ICD-10-CM

## 2017-02-13 DIAGNOSIS — R31.9 URINARY TRACT INFECTION WITH HEMATURIA, SITE UNSPECIFIED: ICD-10-CM

## 2017-02-13 LAB
ALBUMIN SERPL BCP-MCNC: 3.4 G/DL
ALP SERPL-CCNC: 122 U/L
ALT SERPL W/O P-5'-P-CCNC: 26 U/L
ANION GAP SERPL CALC-SCNC: 11 MMOL/L
AST SERPL-CCNC: 38 U/L
BACTERIA #/AREA URNS HPF: ABNORMAL /HPF
BASOPHILS # BLD AUTO: 0.02 K/UL
BASOPHILS NFR BLD: 0.2 %
BILIRUB SERPL-MCNC: 1 MG/DL
BILIRUB UR QL STRIP: ABNORMAL
BUN SERPL-MCNC: 18 MG/DL
CALCIUM SERPL-MCNC: 10.2 MG/DL
CHLORIDE SERPL-SCNC: 99 MMOL/L
CLARITY UR: CLEAR
CO2 SERPL-SCNC: 26 MMOL/L
COLOR UR: ABNORMAL
CREAT SERPL-MCNC: 1.8 MG/DL
DIFFERENTIAL METHOD: ABNORMAL
EOSINOPHIL # BLD AUTO: 0.2 K/UL
EOSINOPHIL NFR BLD: 2 %
ERYTHROCYTE [DISTWIDTH] IN BLOOD BY AUTOMATED COUNT: 12.7 %
EST. GFR  (AFRICAN AMERICAN): 43 ML/MIN/1.73 M^2
EST. GFR  (NON AFRICAN AMERICAN): 38 ML/MIN/1.73 M^2
GLUCOSE SERPL-MCNC: 130 MG/DL
GLUCOSE UR QL STRIP: NEGATIVE
HCT VFR BLD AUTO: 44.1 %
HGB BLD-MCNC: 15.1 G/DL
HGB UR QL STRIP: ABNORMAL
HYALINE CASTS #/AREA URNS LPF: 0 /LPF
KETONES UR QL STRIP: ABNORMAL
LEUKOCYTE ESTERASE UR QL STRIP: NEGATIVE
LYMPHOCYTES # BLD AUTO: 1.5 K/UL
LYMPHOCYTES NFR BLD: 13 %
MCH RBC QN AUTO: 32.1 PG
MCHC RBC AUTO-ENTMCNC: 34.2 %
MCV RBC AUTO: 94 FL
MICROSCOPIC COMMENT: ABNORMAL
MONOCYTES # BLD AUTO: 1.4 K/UL
MONOCYTES NFR BLD: 11.8 %
NEUTROPHILS # BLD AUTO: 8.5 K/UL
NEUTROPHILS NFR BLD: 72.7 %
NITRITE UR QL STRIP: POSITIVE
PH UR STRIP: 5 [PH] (ref 5–8)
PLATELET # BLD AUTO: 215 K/UL
PMV BLD AUTO: 9.7 FL
POTASSIUM SERPL-SCNC: 4.1 MMOL/L
PROT SERPL-MCNC: 8 G/DL
PROT UR QL STRIP: ABNORMAL
RBC # BLD AUTO: 4.71 M/UL
RBC #/AREA URNS HPF: >100 /HPF (ref 0–4)
SODIUM SERPL-SCNC: 136 MMOL/L
SP GR UR STRIP: >=1.03 (ref 1–1.03)
URN SPEC COLLECT METH UR: ABNORMAL
UROBILINOGEN UR STRIP-ACNC: 1 EU/DL
WBC # BLD AUTO: 11.74 K/UL
WBC #/AREA URNS HPF: 25 /HPF (ref 0–5)

## 2017-02-13 PROCEDURE — 80053 COMPREHEN METABOLIC PANEL: CPT

## 2017-02-13 PROCEDURE — 81000 URINALYSIS NONAUTO W/SCOPE: CPT

## 2017-02-13 PROCEDURE — 87086 URINE CULTURE/COLONY COUNT: CPT

## 2017-02-13 PROCEDURE — 85025 COMPLETE CBC W/AUTO DIFF WBC: CPT

## 2017-02-13 PROCEDURE — 99284 EMERGENCY DEPT VISIT MOD MDM: CPT

## 2017-02-13 RX ORDER — CIPROFLOXACIN 500 MG/1
500 TABLET ORAL 2 TIMES DAILY
Qty: 14 TABLET | Refills: 0 | Status: SHIPPED | OUTPATIENT
Start: 2017-02-13 | End: 2017-02-20

## 2017-02-13 NOTE — ED AVS SNAPSHOT
[facility]  [address]  [place] [address]               Cesar Oliveira   2017 12:30 PM   ED    Description:  Male : 1945   Department:  [facility]           Your Care was Coordinated By:     Provider Role From To    Alona Spicer MD Attending Provider 17 1303 17 1323    Geno Olmos MD Attending Provider 17 1323 --    JANETH Pandey Physician Assistant 17 [phone] --      Reason for Visit     Post-op Problem           Diagnoses this Visit        Comments    Post-op pain    -  Primary     Urinary tract infection with hematuria, site unspecified           ED Disposition     None           To Do List           Follow-up Information     Follow up with Faheem Gaming MD. Go in 1 week.    Specialty:  Family Medicine    Contact information:    [address]  [address]  [place] [address]  [phone]          Follow up with Glenn Schuster MD. Go on 2/15/2017.    Specialty:  Urology    Why:  as scheduled    Contact information:    [address]  [address]  [place] [address]  [phone]         These Medications        Disp Refills Start End    ciprofloxacin HCl (CIPRO) 500 MG tablet 14 tablet 0 2017    Take 1 tablet (500 mg total) by mouth 2 (two) times daily. - Oral    Pharmacy: [facility] [address] [place] [address] Ph #: [phone]         [facility]     [facility] -  Assistance  Registered nurses in the [facility] provide clinical advisement, health education, appointment booking, and other advisory services.  Call for this free service at [phone].             Medications           Message regarding Medications     Verify the changes and/or additions to your medication regime listed below are the same as discussed with your clinician today.  If any of these changes or additions are incorrect,  "please notify your healthcare provider.        START taking these NEW medications        Refills    ciprofloxacin HCl (CIPRO) 500 MG tablet 0    Sig: Take 1 tablet (500 mg total) by mouth 2 (two) times daily.    Class: Print    Route: Oral      STOP taking these medications     hydrocodone-acetaminophen 10-325mg (NORCO)  mg Tab Take 1 tablet by mouth every 4 (four) hours as needed.           Verify that the below list of medications is an accurate representation of the medications you are currently taking.  If none reported, the list may be blank. If incorrect, please contact your healthcare provider. Carry this list with you in case of emergency.           Current Medications     ciprofloxacin HCl (CIPRO) 500 MG tablet Take 1 tablet (500 mg total) by mouth 2 (two) times daily.    glipiZIDE (GLUCOTROL) 5 MG tablet Take 5 mg by mouth daily with breakfast.     metformin (GLUCOPHAGE) 1000 MG tablet Take 1,000 mg by mouth every evening.     multivitamin (ONE DAILY MULTIVITAMIN) per tablet Take 1 tablet by mouth once daily.    pioglitazone (ACTOS) 45 MG tablet Take 45 mg by mouth once daily.    pravastatin (PRAVACHOL) 40 MG tablet     PREVNAR 13, PF, 0.5 mL Syrg TO BE ADMINISTERED BY PHARMACIST FOR IMMUNIZATION    verapamil (CALAN-SR) 120 MG CR tablet Take 1 tablet (120 mg total) by mouth once daily.    warfarin (COUMADIN) 5 MG tablet TAKE ONE TABLET BY MOUTH ONCE DAILY           Clinical Reference Information           Your Vitals Were     BP Pulse Temp Resp Height Weight    111/70 (BP Location: Left arm, Patient Position: Sitting, BP Method: Automatic) 86 98 °F (36.7 °C) (Oral) 18 5' 11" (1.803 m) 82.6 kg (182 lb)    SpO2 BMI             96% 25.38 kg/m2         Allergies as of 2/13/2017        Reactions    Shellfish Containing Products Other (See Comments)    GOUT      Immunizations Administered on Date of Encounter - 2/13/2017     None      ED Micro, Lab, POCT     Start Ordered       Status Ordering Provider    " 02/13/17 1327 02/13/17 1327  CBC auto differential  STAT      Final result     02/13/17 1327 02/13/17 1327  Comprehensive metabolic panel  STAT      Final result     02/13/17 1327 02/13/17 1327  Urinalysis  STAT      Final result     02/13/17 1327 02/13/17 1327  Urinalysis Microscopic  Once      Final result       ED Imaging Orders     None        Discharge Instructions         Bladder Infection, Male (Adult)    You have a bladder infection.  Urine is normally free of bacteria. But bacteria can get into the urinary tract from the skin around the rectum or it may travel in the blood from elsewhere in the body.  This is called a urinary tract infection (UTI). An infection can occur anywhere in the urinary tract. It could be in a kidney (pyelonephritis)or in the bladder (cystitis) and urethra (urethritis). The urethra is the tube that drains the urine from the bladder through the tip of the penis.  The most common place for a UTI is in the bladder. This is called a bladder infection. Most bladder infections are easily treated. They are not serious unless the infection spreads up to the kidney.  The terms bladder infection, UTI, and cystitis are often used to describe the same thing, but they arent always the same. Cystitis is an inflammation of the bladder. The most common cause of cystitis is an infection.   Keep in mind:  · Infections in the urine are called UTIs.  · Cystitis is usually caused by a UTI.  · Not all UTIs and cases of cystitis are bladder infections.  · Bladder infections are the most common type of cystitis.  Symptoms of a bladder infection  The infection causes inflammation in the urethra and bladder. This inflammation causes many of the symptoms. The most common symptoms of a bladder infection are:  · Pain or burning when urinating  · Having to go more often than usual  · Feeling like you need to go right away  · Only a small amount comes out  · Blood in urine  · Discomfort in your belly (abdomen),  usually in the lower abdomen, above the pubic bone  · Cloudy, strong, or bad smelling urine  · Unable to urinate (retention)  · Urinary incontinence  · Fever  · Loss of appetite  Older adults may also feel confused.  Causes of a bladder infection  Bladder infections are not contagious. You can't get one from someone else, from a toilet seat, or from sharing a bath.  The most common cause of bladder infections is bacteria from the bowels. The bacteria get onto the skin around the opening of the urethra. From there they can get into the urine and travel up to the bladder. This causes inflammation and an infection. This usually happens because of:  · An enlarged prostate  · Poor cleaning of the genitals  · Procedures that put a tube in your bladder, like a Cuevas catheter  · Bowel incontinence  · Older age  · Not emptying your bladder (The urine stays there, giving the bacteria a chance to grow.)  · Dehydration (This allows urine to stay in the bladder longer.)  · Constipation (This can cause the bowels to push on the bladder or urethra and keep the bladder from emptying.)  Treatment  Bladder infections are treated with antibiotics. They usually clear up quickly without complications. Treatment helps prevent a more serious kidney infection.  Medicines  Medicines can help in the treatment of a bladder infection:  · You may have been given phenazopyridine to ease burning when you urinate. It will cause your urine to be bright orange. It can stain clothing.  · You may have been prescribed antibiotics. Take this medicine until you have finished it, even if you feel better. Taking all of the medicine will make sure the infection has cleared.  You can use acetaminophen or ibuprofen for pain, fever, or discomfort, unless another medicine was prescribed. You can also alternate them, or use both together. They work differently and are a different class of medicines, so taking them together is not an overdose. If you have chronic  liver or kidney disease, talk with your healthcare provider before using these medicines. Also talk with your provider if youve had a stomach ulcer or GI bleeding or are taking blood thinner medicines.  Home care  Here are some guidelines to help you care for yourself at home:  · Drink plenty of fluids, unless your healthcare provider told you not to. Fluids will prevent dehydration and flush out your bladder.  · Use good personal hygiene. Wipe from front to back after using the toilet, and clean your penis regularly. If you arent circumcised, retract the foreskin when cleaning.  · Urinate more frequently, and dont try to hold it in for long periods of time, if possible.  · Wear loose-fitting clothes and cotton underwear. Avoid tight-fitting pants. This helps keep you clean and dry.  · Change your diet to prevent constipation. This means eating more fresh foods and more fiber, and less junk and fatty foods.  · Avoid sex until your symptoms are gone.  · Avoid caffeine, alcohol, and spicy foods. These can irritate the bladder.  Follow-up care  Follow up with your healthcare provider, or as advised if all symptoms have not cleared up within 5 days. It is important to keep your follow-up appointment. You can talk with your provider to see if you need more tests of the urinary tract. This is especially important if you have infections that keep coming back.  If a culture was done, you will be told if your treatment needs to be changed. If directed, you can call to find out the results.  If X-rays were taken, you will be told of any findings that may affect your care.  Call 911  Call 911 if any of these occur:  · Trouble breathing  · Difficulty waking up  · Feeling confused  · Fainting or loss of consciousness  · Rapid heart rate  When to seek medical advice  Call your healthcare provider right away if any of these occur:  · Fever of 100.4ºF (38ºC) or higher, or as directed by your healthcare provider  · Your symptoms  dont improve after 2 days of treatment  · Back or abdominal pain that gets worse  · Repeated vomiting, or you arent able to keep medicine down  · Weakness or dizziness  Date Last Reviewed: 10/1/2016  © 4395-1779 [employer]. [address] [place] [address], [place] [address]. All rights reserved. This information is not intended as a substitute for professional medical care. Always follow your healthcare professional's instructions.          Your Scheduled Appointments     Feb 15, 2017  9:00 AM CST   Ir Dosc with [facility] IR1   [facility]    [address] [place] [id number] [phone]            May 09, 2017  8:00 AM CDT   Follow Up with Omar Lantigua MD   [facility] OMAR LANTIGUA M.D. ETHAN ([address]  [address] [place] [id number] [phone]              Smoking Cessation     If you would like to quit smoking:   You may be eligible for free services if you are a [place] resident and started smoking cigarettes before September 1, 1988.  Call the Smoking Cessation Trust (SCT) toll free at (454) 357-6696 or (744) 622-9594.   Call 3-800-QUIT-NOW if you do not meet the above criteria.             [facility] complies with applicable Federal civil rights laws and does not discriminate on the basis of race, color, national origin, age, disability, or sex.        Language Assistance Services     ATTENTION: Language assistance services are available, free of charge. Please call [phone].      ATENCIÓN: Si habla emilionader, tiene a harding disposición servicios gratuitos de asistencia lingüística. Llame al [phone].     CHÚ Ý: N?u b?n nói Ti?ng Vi?t, có các d?ch v? h? tr? ngôn ng? mi?n phí dành cho b?n. G?i s? [phone].

## 2017-02-13 NOTE — ED NOTES
Occlusive dressing applied to right flank with sterile gauze and sterile drainage bag applied.  Pt tolerated well

## 2017-02-13 NOTE — ED PROVIDER NOTES
Encounter Date: 2/13/2017       History     Chief Complaint   Patient presents with    Post-op Problem     had surgery by dr guadarrama last week; pt states has drainage to dressing; sanguineous drainage noted; also c/o pain to site; denies odor to drainage or fever;      Review of patient's allergies indicates:   Allergen Reactions    Shellfish containing products Other (See Comments)     GOUT     HPI Comments: Cesar Oliveira 71 y.o.male with PMH of kidney stones S/P stent and nephrostomy tube placement, a fib, Dm and HLD presented to the ED with C/O post-op complication.  He states that he had right nephrostomy tube place last week and note last night some increased drainage to the dressing that was placed at that time. He describes the drainage as pink in color with no purulent material and is not malodorous. He initially went to his urologist office today (Dr. Guadarrama) however, was not aware he is at a different campus today and came to the ED for evaluation.  He does report some pain to the incisional area since the procedure that is improving. He dislikes the pain medication due to adverse side effects and is not taking this. He C/O some  chills last night however denies fever. He denies URI symptoms, CP, SOB, abdominal pain, dysuria, penile pain or drainage, scrotal pain or drainage. He denies trying any medications for the symptoms and is scheduled to see Dr. Guadarrama on Wednesday.     The history is provided by the patient.     Past Medical History   Diagnosis Date    Atrial fibrillation     Diabetes mellitus     Gout     Hyperlipidemia     Kidney stone     Nephrolithiasis      Past Medical History Pertinent Negatives   Diagnosis Date Noted    Elevated PSA 7/13/2015    STD (sexually transmitted disease) 8/10/2015    Urinary tract infection 7/13/2015     Past Surgical History   Procedure Laterality Date    Throat surgery  2000     for sleep apnea     Uvulectomy  2000    Extracorporeal shock wave  lithotripsy  05/2016    Cardiac catheterization       Family History   Problem Relation Age of Onset    Prostate cancer Neg Hx     Kidney disease Neg Hx      Social History   Substance Use Topics    Smoking status: Current Every Day Smoker     Packs/day: 1.00     Years: 30.00     Types: Cigarettes     Last attempt to quit: 8/7/2015    Smokeless tobacco: Never Used    Alcohol use 0.0 oz/week     0 Standard drinks or equivalent per week      Comment: rarely     Review of Systems   Constitutional: Positive for chills. Negative for activity change, appetite change and fever.   Respiratory: Negative for cough, chest tightness and shortness of breath.    Cardiovascular: Negative for chest pain.   Gastrointestinal: Negative for abdominal pain, nausea and vomiting.   Genitourinary: Positive for flank pain and hematuria. Negative for difficulty urinating, discharge, dysuria, frequency, penile pain and testicular pain.   Musculoskeletal: Negative for back pain and gait problem.        Localized back pain to the incisional site   Skin: Positive for wound. Negative for rash.        Nephrostomy drain noted   Neurological: Negative for dizziness, weakness, light-headedness and headaches.   Hematological: Does not bruise/bleed easily.       Physical Exam   Initial Vitals   BP Pulse Resp Temp SpO2   02/13/17 1224 02/13/17 1224 02/13/17 1224 02/13/17 1224 02/13/17 1224   130/71 102 18 97.7 °F (36.5 °C) 97 %     Physical Exam    Nursing note and vitals reviewed.  Constitutional: Vital signs are normal. He appears well-developed and well-nourished. He is cooperative.  Non-toxic appearance. He does not appear ill. No distress.   HENT:   Head: Normocephalic and atraumatic.   Eyes: Conjunctivae and lids are normal.   Neck: Trachea normal and normal range of motion. Neck supple. No stridor present. No tracheal deviation present.   Cardiovascular: Normal rate and regular rhythm.   Pulmonary/Chest: Breath sounds normal. No respiratory  distress. He has no wheezes. He has no rhonchi.   Abdominal: Soft. Normal appearance. He exhibits no distension. There is no tenderness.   See MSK   Musculoskeletal: Normal range of motion.        Back:    Circled region representing right nephrostomy tube with serosanguinous fluid noted to dressing. It appears to be drainage from incision site with no purulent material, erythema, fluctuance, TTP or induration of the incisional site.    Neurological: He is alert and oriented to person, place, and time. He has normal strength. GCS eye subscore is 4. GCS verbal subscore is 5. GCS motor subscore is 6.   Skin: Skin is warm, dry and intact. No rash noted.   Psychiatric: He has a normal mood and affect. His speech is normal and behavior is normal. Thought content normal.         ED Course   Procedures  Labs Reviewed   CBC W/ AUTO DIFFERENTIAL - Abnormal; Notable for the following:        Result Value    MCH 32.1 (*)     Gran # 8.5 (*)     Mono # 1.4 (*)     Lymph% 13.0 (*)     All other components within normal limits   COMPREHENSIVE METABOLIC PANEL - Abnormal; Notable for the following:     Glucose 130 (*)     Creatinine 1.8 (*)     Albumin 3.4 (*)     eGFR if  43 (*)     eGFR if non  38 (*)     All other components within normal limits   URINALYSIS - Abnormal; Notable for the following:     Specific Gravity, UA >=1.030 (*)     Protein, UA 3+ (*)     Ketones, UA 2+ (*)     Bilirubin (UA) 1+ (*)     Occult Blood UA 3+ (*)     Nitrite, UA Positive (*)     All other components within normal limits   URINALYSIS MICROSCOPIC - Abnormal; Notable for the following:     RBC, UA >100 (*)     WBC, UA 25 (*)     Bacteria, UA Few (*)     All other components within normal limits   Cesar Oliveira 71 y.o.male with PMH of kidney stones S/P stent and nephrostomy tube placement, a fib, Dm and HLD presented to the ED with C/O post-op complication.  He states that he had right nephrostomy tube place last week  and note last night some increased drainage to the dressing that was placed at that time. He describes the drainage as pink in color with no purulent material and is not malodorous. He initially went to his urologist office today (Dr. Schuster) however, was not aware he is at a different campus today and came to the ED for evaluation.  He does report some pain to the incisional area since the procedure that is improving. He dislikes the pain medication due to adverse side effects and is not taking this. He C/O some  chills last night however denies fever. He denies URI symptoms, CP, SOB, abdominal pain, dysuria, penile pain or drainage, scrotal pain or drainage. He denies trying any medications for the symptoms and is scheduled to see Dr. Schuster on Wednesday.  ROS positive for post op complication.  Physical exam reveals patient that is well appearing in no obvious distress. Lungs clear, heart irregular irregular rate and rhythm. Abdomen is soft with no TTP, rigidity or rebound. Right flank shows Circled region representing right nephrostomy tube with serosanguinous fluid noted to dressing. It appears to be drainage from incision site with no purulent material, erythema, fluctuance, TTP or induration of the incisional site. Fair ROM of all extremities with strength 5/5 bilaterally. Skin free of rash and diaphoresis.    DDX: post op complication, skin infection, fistula, seroma, abscess, UTI    ED management: dressing changes with scant serosangonous drainage. Bedside US shows small amount of subcutaneous fluid that is likely seroma as low suspicion of abscess at this time as nontender, no erythema and well healing in appearance. Discussed patient with Dr. Schuster who recommends a bag be placed on nephrostomy tube with D/C home pending normal lab findings with follow up with him as scheduled for 2/15 for probable reversible. Labs are stable with no leukocytosis. UA with many RBC as expected; bacteria and nitrites noted  and will place on ABX; pending culture    Impression/Plan:The primary encounter diagnosis was Post-op pain. A diagnosis of Urinary tract infection with hematuria, site unspecified was also pertinent to this visit. Discharged with Cipro Patient will follow up with Primary.  Patient cautioned on when to return to ED.  Pt. Understands and agrees with current treatment plan                         Attending Attestation:     Physician Attestation Statement for NP/PA:   I have conducted a face to face encounter with this patient in addition to the NP/PA, due to NP/PA Request    Other NP/PA Attestation Additions:    History of Present Illness: 72 yo M here with drainage from nephrostomy tube, no fevers, chills or back pain. Attempted to go to Dr Schuster's office today, but was closed   Physical Exam: Well appearing, in NAD, A&Ox3, lungs clear, serosanginous fluid draining around the site, pads soaked, no obvious fluctunace   Medical Decision Making: UA with nitrate positive UTI- given cipro  WBC normal  Discussed with Dr Schuster  Close outpatient followup                 ED Course     Clinical Impression:   The primary encounter diagnosis was Post-op pain. A diagnosis of Urinary tract infection with hematuria, site unspecified was also pertinent to this visit.          JANETH Pandey  02/17/17 0934       Geno Olmos MD  02/21/17 2004

## 2017-02-13 NOTE — TELEPHONE ENCOUNTER
----- Message from Jolly Herrera sent at 2/13/2017  8:18 AM CST -----  Contact: [phone]  Patient is requesting to come in today, he states his dressing needs changing

## 2017-02-13 NOTE — DISCHARGE INSTRUCTIONS
Bladder Infection, Male (Adult)    You have a bladder infection.  Urine is normally free of bacteria. But bacteria can get into the urinary tract from the skin around the rectum or it may travel in the blood from elsewhere in the body.  This is called a urinary tract infection (UTI). An infection can occur anywhere in the urinary tract. It could be in a kidney (pyelonephritis)or in the bladder (cystitis) and urethra (urethritis). The urethra is the tube that drains the urine from the bladder through the tip of the penis.  The most common place for a UTI is in the bladder. This is called a bladder infection. Most bladder infections are easily treated. They are not serious unless the infection spreads up to the kidney.  The terms bladder infection, UTI, and cystitis are often used to describe the same thing, but they arent always the same. Cystitis is an inflammation of the bladder. The most common cause of cystitis is an infection.   Keep in mind:  · Infections in the urine are called UTIs.  · Cystitis is usually caused by a UTI.  · Not all UTIs and cases of cystitis are bladder infections.  · Bladder infections are the most common type of cystitis.  Symptoms of a bladder infection  The infection causes inflammation in the urethra and bladder. This inflammation causes many of the symptoms. The most common symptoms of a bladder infection are:  · Pain or burning when urinating  · Having to go more often than usual  · Feeling like you need to go right away  · Only a small amount comes out  · Blood in urine  · Discomfort in your belly (abdomen), usually in the lower abdomen, above the pubic bone  · Cloudy, strong, or bad smelling urine  · Unable to urinate (retention)  · Urinary incontinence  · Fever  · Loss of appetite  Older adults may also feel confused.  Causes of a bladder infection  Bladder infections are not contagious. You can't get one from someone else, from a toilet seat, or from sharing a bath.  The most  common cause of bladder infections is bacteria from the bowels. The bacteria get onto the skin around the opening of the urethra. From there they can get into the urine and travel up to the bladder. This causes inflammation and an infection. This usually happens because of:  · An enlarged prostate  · Poor cleaning of the genitals  · Procedures that put a tube in your bladder, like a Cuevas catheter  · Bowel incontinence  · Older age  · Not emptying your bladder (The urine stays there, giving the bacteria a chance to grow.)  · Dehydration (This allows urine to stay in the bladder longer.)  · Constipation (This can cause the bowels to push on the bladder or urethra and keep the bladder from emptying.)  Treatment  Bladder infections are treated with antibiotics. They usually clear up quickly without complications. Treatment helps prevent a more serious kidney infection.  Medicines  Medicines can help in the treatment of a bladder infection:  · You may have been given phenazopyridine to ease burning when you urinate. It will cause your urine to be bright orange. It can stain clothing.  · You may have been prescribed antibiotics. Take this medicine until you have finished it, even if you feel better. Taking all of the medicine will make sure the infection has cleared.  You can use acetaminophen or ibuprofen for pain, fever, or discomfort, unless another medicine was prescribed. You can also alternate them, or use both together. They work differently and are a different class of medicines, so taking them together is not an overdose. If you have chronic liver or kidney disease, talk with your healthcare provider before using these medicines. Also talk with your provider if youve had a stomach ulcer or GI bleeding or are taking blood thinner medicines.  Home care  Here are some guidelines to help you care for yourself at home:  · Drink plenty of fluids, unless your healthcare provider told you not to. Fluids will prevent  dehydration and flush out your bladder.  · Use good personal hygiene. Wipe from front to back after using the toilet, and clean your penis regularly. If you arent circumcised, retract the foreskin when cleaning.  · Urinate more frequently, and dont try to hold it in for long periods of time, if possible.  · Wear loose-fitting clothes and cotton underwear. Avoid tight-fitting pants. This helps keep you clean and dry.  · Change your diet to prevent constipation. This means eating more fresh foods and more fiber, and less junk and fatty foods.  · Avoid sex until your symptoms are gone.  · Avoid caffeine, alcohol, and spicy foods. These can irritate the bladder.  Follow-up care  Follow up with your healthcare provider, or as advised if all symptoms have not cleared up within 5 days. It is important to keep your follow-up appointment. You can talk with your provider to see if you need more tests of the urinary tract. This is especially important if you have infections that keep coming back.  If a culture was done, you will be told if your treatment needs to be changed. If directed, you can call to find out the results.  If X-rays were taken, you will be told of any findings that may affect your care.  Call 911  Call 911 if any of these occur:  · Trouble breathing  · Difficulty waking up  · Feeling confused  · Fainting or loss of consciousness  · Rapid heart rate  When to seek medical advice  Call your healthcare provider right away if any of these occur:  · Fever of 100.4ºF (38ºC) or higher, or as directed by your healthcare provider  · Your symptoms dont improve after 2 days of treatment  · Back or abdominal pain that gets worse  · Repeated vomiting, or you arent able to keep medicine down  · Weakness or dizziness  Date Last Reviewed: 10/1/2016  © 8923-1275 [employer] [address] [place] [address], [place] [address]. All rights reserved. This information is not intended as a substitute for professional  medical care. Always follow your healthcare professional's instructions.

## 2017-02-13 NOTE — ED NOTES
Pt had nephrostogram on 2/8 by Dr Schuster, dressing to right flank in place.  Pt states that he noticed increased drainage from site in last 24 hours.  Has been taking aleve for pain.  Went to Dr Schuster's office today but was unable to see him.  Has follow up appt tomorrow.

## 2017-02-14 ENCOUNTER — TELEPHONE (OUTPATIENT)
Dept: UROLOGY | Facility: CLINIC | Age: 72
End: 2017-02-14

## 2017-02-14 LAB — BACTERIA UR CULT: NO GROWTH

## 2017-02-14 NOTE — NURSING
Left message for pt to arrive at 0900. advd npo at [place] and take all meds as normal and must have a ride

## 2017-02-14 NOTE — TELEPHONE ENCOUNTER
----- Message from Dahiana Barfield sent at 2/14/2017  8:09 AM CST -----  Contact: [phone]  Pt would like to speak with the nurse regarding his surgery/Please advise.

## 2017-02-15 ENCOUNTER — SURGERY (OUTPATIENT)
Age: 72
End: 2017-02-15

## 2017-02-15 ENCOUNTER — HOSPITAL ENCOUNTER (OUTPATIENT)
Facility: HOSPITAL | Age: 72
Discharge: HOME | End: 2017-02-15
Attending: UROLOGY | Admitting: RADIOLOGY
Payer: MEDICARE

## 2017-02-15 ENCOUNTER — HOSPITAL ENCOUNTER (OUTPATIENT)
Dept: INTERVENTIONAL RADIOLOGY/VASCULAR | Facility: HOSPITAL | Age: 72
Discharge: HOME | End: 2017-02-15
Attending: UROLOGY
Payer: MEDICARE

## 2017-02-15 VITALS
BODY MASS INDEX: 25.2 KG/M2 | HEIGHT: 71 IN | SYSTOLIC BLOOD PRESSURE: 106 MMHG | OXYGEN SATURATION: 95 % | WEIGHT: 180 LBS | HEART RATE: 102 BPM | TEMPERATURE: 98 F | RESPIRATION RATE: 18 BRPM | DIASTOLIC BLOOD PRESSURE: 66 MMHG

## 2017-02-15 DIAGNOSIS — N20.0 CALCULUS, RENAL: ICD-10-CM

## 2017-02-15 PROCEDURE — 50431 NJX PX NFROSGRM &/URTRGRM: CPT | Mod: ,,, | Performed by: RADIOLOGY

## 2017-02-15 PROCEDURE — 50431 NJX PX NFROSGRM &/URTRGRM: CPT

## 2017-02-15 NOTE — IP AVS SNAPSHOT
\[facility]\""  [address]  [place] [address]  Phone: [phone]           Patient Discharge Instructions     Our goal is to set you up for success. This packet includes information on your condition, medications, and your home care. It will help you to care for yourself so you don't get sicker and need to go back to the hospital.     Please ask your nurse if you have any questions.        There are many details to remember when preparing to leave the hospital. Here is what you will need to do:    1. Take your medicine. If you are prescribed medications, review your Medication List in the following pages. You may have new medications to  at the pharmacy and others that you'll need to stop taking. Review the instructions for how and when to take your medications. Talk with your doctor or nurses if you are unsure of what to do.     2. Go to your follow-up appointments. Specific follow-up information is listed in the following pages. Your may be contacted by a transition nurse or clinical provider about future appointments. Be sure we have all of the phone numbers to reach you, if needed. Please contact your provider's office if you are unable to make an appointment.     3. Watch for warning signs. Your doctor or nurse will give you detailed warning signs to watch for and when to call for assistance. These instructions may also include educational information about your condition. If you experience any of warning signs to your health, call your doctor.               [facility]  Unless otherwise directed by your provider, please contact [facility], our nurse care line that is available for 24/7 assistance.     [phone] (toll-free)    Registered nurses in the [facility] provide clinical advisement, health education, appointment booking, and other advisory services.                    ** Verify the list of medication(s) below is accurate and up to date. Carry this  with you in case of emergency. If your medications have changed, please notify your healthcare provider.             Medication List      Notice     This visit is during an admission. Changes to the med list made in this visit will be reflected in the After Visit Summary of the admission.               Please bring to all follow up appointments:    1. A copy of your discharge instructions.  2. All medicines you are currently taking in their original bottles.  3. Identification and insurance card.    Please arrive 15 minutes ahead of scheduled appointment time.    Please call 24 hours in advance if you must reschedule your appointment and/or time.        Your Scheduled Appointments     May 09, 2017  8:00 AM CDT   Follow Up with Omar Lantigua MD   [facility] ([facility])    [address] [place] [address]  [address] [place] [id number] [phone]                  Admission Information     Date & Time Provider Department CSN    2/15/2017  9:00 AM Glenn Schuster MD [facility] [id number]      Care Providers     Provider Role Specialty Primary office phone    Glenn Schuster MD Attending Provider Urology [phone]      Recent Lab Values        9/27/2011 2/8/2017                       11:40 AM  7:20 PM          A1C 9.1 (H) 8.4 (H)          Comment for A1C at  7:20 PM on 2/8/2017:  According to ADA guidelines, hemoglobin A1C <7.0% represents  optimal control in non-pregnant diabetic patients.  Different  metrics may apply to specific populations.   Standards of Medical Care in Diabetes - 2016.  For the purpose of screening for the presence of diabetes:  <5.7%     Consistent with the absence of diabetes  5.7-6.4%  Consistent with increasing risk for diabetes   (prediabetes)  >or=6.5%  Consistent with diabetes  Currently no consensus exists for use of hemoglobin A1C  for diagnosis of diabetes for children.        Allergies as of 2/15/2017        Reactions    Shellfish  Containing Products Other (See Comments)    GOUT      Advance Directives     An advance directive is a document which, in the event you are no longer able to make decisions for yourself, tells your healthcare team what kind of treatment you do or do not want to receive, or who you would like to make those decisions for you.  If you do not currently have an advance directive, [facility] encourages you to create one.  For more information call:  [phone]), [phone] ([phone]),  or log on to [email or web address].        Smoking Cessation     If you would like to quit smoking:   You may be eligible for free services if you are a [place] resident and started smoking cigarettes before September 1, 1988.  Call the Smoking Cessation Trust (SCT) toll free at (213) 250-2612 or (072) 528-1642.   Call 7-919-QUIT-NOW if you do not meet the above criteria.            Language Assistance Services     ATTENTION: Language assistance services are available, free of charge. Please call [phone].      ATENCIÓN: Si habla español, tiene a harding disposición servicios gratuitos de asistencia lingüística. Llame al [phone].     CHÚ Ý: N?u b?n nói Ti?ng Vi?t, có các d?ch v? h? tr? ngôn ng? mi?n phí dành cho b?n. G?i s? [phone].        Coumadin Discharge Instructions                         Diabetes Discharge Instructions                                    [facility] complies with applicable Federal civil rights laws and does not discriminate on the basis of race, color, national origin, age, disability, or sex.

## 2017-02-15 NOTE — IP AVS SNAPSHOT
[facility]  [address]  [place] [address]  Phone: [phone]           Patient Discharge Instructions     Our goal is to set you up for success. This packet includes information on your condition, medications, and your home care. It will help you to care for yourself so you don't get sicker and need to go back to the hospital.     Please ask your nurse if you have any questions.        There are many details to remember when preparing to leave the hospital. Here is what you will need to do:    1. Take your medicine. If you are prescribed medications, review your Medication List in the following pages. You may have new medications to  at the pharmacy and others that you'll need to stop taking. Review the instructions for how and when to take your medications. Talk with your doctor or nurses if you are unsure of what to do.     2. Go to your follow-up appointments. Specific follow-up information is listed in the following pages. Your may be contacted by a transition nurse or clinical provider about future appointments. Be sure we have all of the phone numbers to reach you, if needed. Please contact your provider's office if you are unable to make an appointment.     3. Watch for warning signs. Your doctor or nurse will give you detailed warning signs to watch for and when to call for assistance. These instructions may also include educational information about your condition. If you experience any of warning signs to your health, call your doctor.               [facility]  Unless otherwise directed by your provider, please contact [facility], our nurse care line that is available for 24/7 assistance.     [phone] (toll-free)    Registered nurses in the [facility] provide clinical advisement, health education, appointment booking, and other advisory services.                    ** Verify the list of medication(s) below is accurate and up to date. Carry this  with you in case of emergency. If your medications have changed, please notify your healthcare provider.             Medication List      ASK your doctor about these medications        Additional Info                      ciprofloxacin HCl 500 MG tablet   Commonly known as:  CIPRO   Quantity:  14 tablet   Refills:  0   Dose:  500 mg    Instructions:  Take 1 tablet (500 mg total) by mouth 2 (two) times daily.     Begin Date    AM    Noon    PM    Bedtime       glipiZIDE 5 MG tablet   Commonly known as:  GLUCOTROL   Refills:  0   Dose:  5 mg    Instructions:  Take 5 mg by mouth daily with breakfast.     Begin Date    AM    Noon    PM    Bedtime       metformin 1000 MG tablet   Commonly known as:  GLUCOPHAGE   Refills:  0   Dose:  1000 mg    Instructions:  Take 1,000 mg by mouth every evening.     Begin Date    AM    Noon    PM    Bedtime       ONE DAILY MULTIVITAMIN per tablet   Refills:  0   Dose:  1 tablet   Generic drug:  multivitamin    Instructions:  Take 1 tablet by mouth once daily.     Begin Date    AM    Noon    PM    Bedtime       pioglitazone 45 MG tablet   Commonly known as:  ACTOS   Refills:  0   Dose:  45 mg    Instructions:  Take 45 mg by mouth once daily.     Begin Date    AM    Noon    PM    Bedtime       pravastatin 40 MG tablet   Commonly known as:  PRAVACHOL   Refills:  0      Begin Date    AM    Noon    PM    Bedtime       PREVNAR 13 (PF) 0.5 mL Syrg   Refills:  0   Generic drug:  pneumoc 13-sukhdev conj-dip cr(PF)    Instructions:  TO BE ADMINISTERED BY PHARMACIST FOR IMMUNIZATION     Begin Date    AM    Noon    PM    Bedtime       verapamil 120 MG CR tablet   Commonly known as:  CALAN-SR   Quantity:  90 tablet   Refills:  3   Dose:  120 mg    Instructions:  Take 1 tablet (120 mg total) by mouth once daily.     Begin Date    AM    Noon    PM    Bedtime       warfarin 5 MG tablet   Commonly known as:  COUMADIN   Quantity:  90 tablet   Refills:  3    Instructions:  TAKE ONE TABLET BY MOUTH ONCE DAILY  "    Begin Date    AM    Noon    PM    Bedtime                  Please bring to all follow up appointments:    1. A copy of your discharge instructions.  2. All medicines you are currently taking in their original bottles.  3. Identification and insurance card.    Please arrive 15 minutes ahead of scheduled appointment time.    Please call 24 hours in advance if you must reschedule your appointment and/or time.        Your Scheduled Appointments     May 09, 2017  8:00 AM CDT   Follow Up with Omar Lantigua MD   [facility] ([facility]    [address] [place] [address]  [address]  [place] [address] [phone]                Discharge References/Attachments     NEPHROSTOMY, PERCUTANEOUS, DISCHARGE INSTRUCTIONS (ENGLISH)        Admission Information     Date & Time Provider Department CSN    2/15/2017 10:05 AM Glenn Schuster MD [facility] [id number]      Care Providers     Provider Role Specialty Primary office phone    Glenn Schuster MD Attending Provider Urology [phone]    Dos Diagnostic Provider Surgeon  -- Number not on file      Your Vitals Were     BP Pulse Temp Resp Height Weight    106/66 (BP Location: Right arm, Patient Position: Lying, BP Method: Automatic) 102 98.3 °F (36.8 °C) (Oral) 18 5' 11" (1.803 m) 81.6 kg (180 lb)    SpO2 BMI             95% 25.1 kg/m2         Recent Lab Values        9/27/2011 2/8/2017                       11:40 AM  7:20 PM          A1C 9.1 (H) 8.4 (H)          Comment for A1C at  7:20 PM on 2/8/2017:  According to ADA guidelines, hemoglobin A1C <7.0% represents  optimal control in non-pregnant diabetic patients.  Different  metrics may apply to specific populations.   Standards of Medical Care in Diabetes - 2016.  For the purpose of screening for the presence of diabetes:  <5.7%     Consistent with the absence of diabetes  5.7-6.4%  Consistent with increasing risk for diabetes   (prediabetes)  >or=6.5%  Consistent with " diabetes  Currently no consensus exists for use of hemoglobin A1C  for diagnosis of diabetes for children.        Allergies as of 2/15/2017        Reactions    Shellfish Containing Products Other (See Comments)    GOUT      Advance Directives     An advance directive is a document which, in the event you are no longer able to make decisions for yourself, tells your healthcare team what kind of treatment you do or do not want to receive, or who you would like to make those decisions for you.  If you do not currently have an advance directive, [facility] encourages you to create one.  For more information call:  [phone]), [phone] ([phone]),  or log on to [email or web address].        Smoking Cessation     If you would like to quit smoking:   You may be eligible for free services if you are a [place] resident and started smoking cigarettes before September 1, 1988.  Call the Smoking Cessation Trust ([facility]) toll free at (803) 569-6097 or ([phone] 781-8677.   Call 3-457-QUIT-NOW if you do not meet the above criteria.            Language Assistance Services     ATTENTION: Language assistance services are available, free of charge. Please call [phone].      ATENCIÓN: Si habla español, tiene a harding disposición servicios gratuitos de asistencia lingüística. Llame al [phone].     CHÚ Ý: N?u b?n nói Ti?ng Vi?t, có các d?ch v? h? tr? ngôn ng? mi?n phí dành cho b?n. G?i s? [phone].        Coumadin Discharge Instructions                         Diabetes Discharge Instructions                                    [facility] complies with applicable Federal civil rights laws and does not discriminate on the basis of race, color, national origin, age, disability, or sex.

## 2017-02-15 NOTE — H&P
Radiology History & Physical      SUBJECTIVE:     Chief Complaint: Rt nephrostomy post nephrolithotomy 2/8/17    History of Present Illness:  Cesar Oliveira is a 71 y.o. male who presents for Nephrostogram.  Past Medical History   Diagnosis Date    Atrial fibrillation     Diabetes mellitus     Gout     Hyperlipidemia     Kidney stone     Nephrolithiasis      Past Surgical History   Procedure Laterality Date    Throat surgery  2000     for sleep apnea     Uvulectomy  2000    Extracorporeal shock wave lithotripsy  05/2016    Cardiac catheterization         Home Meds:   Prior to Admission medications    Medication Sig Start Date End Date Taking? Authorizing Provider   ciprofloxacin HCl (CIPRO) 500 MG tablet Take 1 tablet (500 mg total) by mouth 2 (two) times daily. 2/13/17 2/20/17  JANETH Pandey   glipiZIDE (GLUCOTROL) 5 MG tablet Take 5 mg by mouth daily with breakfast.     Historical Provider, MD   metformin (GLUCOPHAGE) 1000 MG tablet Take 1,000 mg by mouth every evening.  7/1/15   Historical Provider, MD   multivitamin (ONE DAILY MULTIVITAMIN) per tablet Take 1 tablet by mouth once daily.    Historical Provider, MD   pioglitazone (ACTOS) 45 MG tablet Take 45 mg by mouth once daily.    Historical Provider, MD   pravastatin (PRAVACHOL) 40 MG tablet  6/10/15   Historical Provider, MD   PREVNAR 13, PF, 0.5 mL Syrg TO BE ADMINISTERED BY PHARMACIST FOR IMMUNIZATION 10/30/16   Historical Provider, MD   verapamil (CALAN-SR) 120 MG CR tablet Take 1 tablet (120 mg total) by mouth once daily. 2/3/17   Omar Lantigua MD   warfarin (COUMADIN) 5 MG tablet TAKE ONE TABLET BY MOUTH ONCE DAILY 1/3/17   Omar Lantigua MD     Anticoagulants/Antiplatelets: no anticoagulation    Allergies:   Review of patient's allergies indicates:   Allergen Reactions    Shellfish containing products Other (See Comments)     GOUT     Sedation History:  no adverse reactions    Review of Systems:   Hematological: no known  coagulopathies  Respiratory: no shortness of breath  Cardiovascular: no chest pain  Gastrointestinal: rt abd transient pain since 2/8/17  Genito-Urinary: no dysuria  Musculoskeletal: negative  Neurological: no TIA or stroke symptoms         OBJECTIVE:     Vital Signs (Most Recent)       Physical Exam:  ASA: 2  Mallampati: 3    General: no acute distress  Mental Status: alert and oriented to person, place and time  HEENT: normocephalic, atraumatic  Chest: unlabored breathing  Heart: regular heart rate  Abdomen: nondistended  Extremity: moves all extremities    Laboratory  Lab Results   Component Value Date    INR 1.0 02/08/2017       Lab Results   Component Value Date    WBC 11.74 02/13/2017    HGB 15.1 02/13/2017    HCT 44.1 02/13/2017    MCV 94 02/13/2017     02/13/2017      Lab Results   Component Value Date     (H) 02/13/2017     02/13/2017    K 4.1 02/13/2017    CL 99 02/13/2017    CO2 26 02/13/2017    BUN 18 02/13/2017    CREATININE 1.8 (H) 02/13/2017    CALCIUM 10.2 02/13/2017    MG 2.1 09/27/2011    ALT 26 02/13/2017    AST 38 02/13/2017    ALBUMIN 3.4 (L) 02/13/2017    BILITOT 1.0 02/13/2017       ASSESSMENT/PLAN:     Sedation Plan: none  Patient will undergo Rt nephrostogram.    Jazmín Concepcion MD  Staff Radiologist  Department of Radiology  Pager: [phone]

## 2017-02-15 NOTE — DISCHARGE SUMMARY
Radiology Post-Procedure Note    Pre Op Diagnosis: S/P Rt PCN, nephrolithotomy  Post Op Diagnosis: Same    Procedure: Rt nephrostogram    Procedure performed by: Jamey SANDOVAL, Jazmín    Written Informed Consent Obtained: Yes  Specimen Removed: NO  Estimated Blood Loss: none    Findings:   Normal    Patient tolerated procedure well.    Jazmín Concepcion MD  Staff Radiologist  Department of Radiology  Pager: [phone]

## 2017-03-21 ENCOUNTER — OFFICE VISIT (OUTPATIENT)
Dept: UROLOGY | Facility: CLINIC | Age: 72
End: 2017-03-21
Payer: MEDICARE

## 2017-03-21 ENCOUNTER — LAB VISIT (OUTPATIENT)
Dept: LAB | Facility: HOSPITAL | Age: 72
End: 2017-03-21
Attending: UROLOGY
Payer: MEDICARE

## 2017-03-21 VITALS
HEIGHT: 71 IN | BODY MASS INDEX: 25.2 KG/M2 | WEIGHT: 180 LBS | DIASTOLIC BLOOD PRESSURE: 84 MMHG | SYSTOLIC BLOOD PRESSURE: 123 MMHG | HEART RATE: 98 BPM

## 2017-03-21 DIAGNOSIS — N20.0 NEPHROLITHIASIS: ICD-10-CM

## 2017-03-21 DIAGNOSIS — N20.0 NEPHROLITHIASIS: Primary | ICD-10-CM

## 2017-03-21 LAB
25(OH)D3+25(OH)D2 SERPL-MCNC: 15 NG/ML
PTH-INTACT SERPL-MCNC: 59 PG/ML
URATE SERPL-MCNC: 5.9 MG/DL

## 2017-03-21 PROCEDURE — 83970 ASSAY OF PARATHORMONE: CPT

## 2017-03-21 PROCEDURE — 99024 POSTOP FOLLOW-UP VISIT: CPT | Mod: S$GLB,,, | Performed by: UROLOGY

## 2017-03-21 PROCEDURE — 84550 ASSAY OF BLOOD/URIC ACID: CPT

## 2017-03-21 PROCEDURE — 82306 VITAMIN D 25 HYDROXY: CPT

## 2017-03-21 PROCEDURE — 36415 COLL VENOUS BLD VENIPUNCTURE: CPT

## 2017-03-21 PROCEDURE — 99999 PR PBB SHADOW E&M-EST. PATIENT-LVL III: CPT | Mod: PBBFAC,,, | Performed by: UROLOGY

## 2017-03-21 RX ORDER — VERAPAMIL HYDROCHLORIDE 120 MG/1
CAPSULE, EXTENDED RELEASE ORAL
COMMUNITY
Start: 2017-02-04 | End: 2017-05-09 | Stop reason: SDUPTHER

## 2017-03-21 NOTE — PROGRESS NOTES
"Subjective:      Patient ID: Cesar Oliveira is a 72 y.o. male.    Chief Complaint: Post-op Evaluation    Patient is a 72 y.o. male who is established to our clinic and was initially referred by their PCP, Dr. Gaming for evaluation of gross hematuria and kidney stones.     HPI  Patient underwent right PCNL on 2/8/17.  Did well.  Subsequently had a stent removal and antegrade nephrostogram followed by nephrostomy tube removal.    Stones are calcium oxalate.  Patient doing well.  No gross hematuria.  Has some itching and feels some "pulling" at the incision site.  Otherwise no complaint.     Has a h/o gout.  No attacks for many years.  Not on medications.    Review of Systems   All other systems reviewed and negative except pertinent positives noted in HPI.    Objective:     Physical Exam   Constitutional: He is oriented to person, place, and time. He appears well-developed and well-nourished. No distress.   HENT:   Head: Normocephalic and atraumatic.   Eyes: No scleral icterus.   Neck: No tracheal deviation present.   Pulmonary/Chest: Effort normal. No respiratory distress.   Neurological: He is alert and oriented to person, place, and time.   Psychiatric: He has a normal mood and affect. His behavior is normal. Judgment and thought content normal.     Assessment:     1. Nephrolithiasis      Plan:     1. Kidney stone:  -General risk factors for kidney stones and the conservative measures to prevent kidney stones in the future were discussed with the patient in detail.  The patient was encouraged to drink 2-3 liters of water a day, limit iced tea and angela as well as foods high in oxalate.  They were cautioned to try to limit salt and red meat intake.  We also discussed adding citrate to the diet with the addition of isauro or lemon juice to their water or alternatively with crystal light.   -Imaging review: post-op CT reviewed.    -24 hour urine: ordered.  Will call with results.  -labs today: uric acid, PTH, vit " D  -f/u in 6 months with a renal US and KUB.

## 2017-03-21 NOTE — MR AVS SNAPSHOT
[facility]  [address] [place] [address] Ave  [place] [address]  Phone: [phone]                  Cesar Oliveira   3/21/2017 7:15 AM   Office Visit    Description:  Male : 1945   Provider:  Glenn Schuster MD   Department:  [facility]           Reason for Visit     Post-op Evaluation           Diagnoses this Visit        Comments    Nephrolithiasis    -  Primary            To Do List           Goals (5 Years of Data)     None      Follow-Up and Disposition     Return in about 6 months (around 2017).      [facility]     [facility] Nurse Care Line -  Assistance  Registered nurses in the [facility] Center provide clinical advisement, health education, appointment booking, and other advisory services.  Call for this free service at [phone].             Medications           Message regarding Medications     Verify the changes and/or additions to your medication regime listed below are the same as discussed with your clinician today.  If any of these changes or additions are incorrect, please notify your healthcare provider.             Verify that the below list of medications is an accurate representation of the medications you are currently taking.  If none reported, the list may be blank. If incorrect, please contact your healthcare provider. Carry this list with you in case of emergency.           Current Medications     glipiZIDE (GLUCOTROL) 5 MG tablet Take 5 mg by mouth daily with breakfast.     metformin (GLUCOPHAGE) 1000 MG tablet Take 1,000 mg by mouth every evening.     multivitamin (ONE DAILY MULTIVITAMIN) per tablet Take 1 tablet by mouth once daily.    pioglitazone (ACTOS) 45 MG tablet Take 45 mg by mouth once daily.    pravastatin (PRAVACHOL) 40 MG tablet     PREVNAR 13, PF, 0.5 mL Syrg TO BE ADMINISTERED BY PHARMACIST FOR IMMUNIZATION    verapamil (CALAN-SR) 120 MG CR tablet Take 1 tablet (120 mg total) by mouth once daily.    warfarin (COUMADIN) 5 MG  "tablet TAKE ONE TABLET BY MOUTH ONCE DAILY    verapamil (VERELAN) 120 MG C24P            Clinical Reference Information           Your Vitals Were     BP Pulse Height Weight BMI    123/84 98 5' 11" (1.803 m) 81.6 kg (180 lb) 25.1 kg/m2      Blood Pressure          Most Recent Value    BP  123/84      Allergies as of 3/21/2017     Shellfish Containing Products      Immunizations Administered on Date of Encounter - 3/21/2017     None      Orders Placed During Today's Visit     Future Labs/Procedures Expected by Expires    PTH, intact  3/21/2017 3/21/2018    Uric acid  3/21/2017 3/21/2018    Vitamin D  3/21/2017 3/21/2018    US Retroperitoneal Complete (Kidney and  9/21/2017 3/21/2018    X-Ray Abdomen AP 1 View  9/21/2017 3/21/2018    UroRisk Diagnostic Profile, 24 Hour  As directed 3/21/2018      Smoking Cessation     If you would like to quit smoking:   You may be eligible for free services if you are a [place] resident and started smoking cigarettes before September 1, 1988.  Call the Smoking Cessation Trust ([facility]) toll free at (727) 587-7204 or (462) 733-8665.   Call 2-337-QUIT-NOW if you do not meet the above criteria.            Language Assistance Services     ATTENTION: Language assistance services are available, free of charge. Please call [phone].      ATENCIÓN: Si habla español, tiene a harding disposición servicios gratuitos de asistencia lingüística. Llame al [phone].     CHÚ Ý: N?u b?n nói Ti?ng Vi?t, có các d?ch v? h? tr? ngôn ng? mi?n phí dành cho b?n. G?i s? [phone].         [facility] complies with applicable Federal civil rights laws and does not discriminate on the basis of race, color, national origin, age, disability, or sex.        "

## 2017-03-27 ENCOUNTER — LAB VISIT (OUTPATIENT)
Dept: LAB | Facility: HOSPITAL | Age: 72
End: 2017-03-27
Attending: UROLOGY
Payer: MEDICARE

## 2017-03-27 DIAGNOSIS — N20.0 NEPHROLITHIASIS: ICD-10-CM

## 2017-03-27 PROCEDURE — 83735 ASSAY OF MAGNESIUM: CPT

## 2017-04-04 LAB — STONE ANALYSIS-IMP: NORMAL

## 2017-05-24 ENCOUNTER — CLINICAL SUPPORT (OUTPATIENT)
Dept: SMOKING CESSATION | Facility: CLINIC | Age: 72
End: 2017-05-24
Payer: COMMERCIAL

## 2017-05-24 DIAGNOSIS — F17.200 NICOTINE DEPENDENCE: Primary | ICD-10-CM

## 2017-05-24 PROCEDURE — 99404 PREV MED CNSL INDIV APPRX 60: CPT | Mod: S$GLB,,,

## 2017-05-24 PROCEDURE — 99999 PR PBB SHADOW E&M-EST. PATIENT-LVL I: CPT | Mod: PBBFAC,,,

## 2017-05-24 RX ORDER — ASPIRIN/CALCIUM CARB/MAGNESIUM 325 MG
4 TABLET ORAL
Qty: 168 LOZENGE | Refills: 0 | Status: SHIPPED | OUTPATIENT
Start: 2017-05-24 | End: 2017-06-23

## 2017-05-24 RX ORDER — IBUPROFEN 200 MG
1 TABLET ORAL DAILY
Qty: 28 PATCH | Refills: 0 | Status: SHIPPED | OUTPATIENT
Start: 2017-05-24 | End: 2017-06-23

## 2017-05-24 NOTE — PROGRESS NOTES
See Tobacco Cessation Intake Form for patient assessment and recommendations.  Exhaled carbon monoxide level was 27 ppm per [employer].

## 2017-05-25 ENCOUNTER — TELEPHONE (OUTPATIENT)
Dept: UROLOGY | Facility: CLINIC | Age: 72
End: 2017-05-25

## 2017-05-25 NOTE — TELEPHONE ENCOUNTER
----- Message from Yoselyn Leon MA sent at 5/24/2017 10:15 AM CDT -----  Contact: self  Calling to speak with miky regarding results.     He has questions that miky can answer.

## 2017-08-02 ENCOUNTER — TELEPHONE (OUTPATIENT)
Dept: SMOKING CESSATION | Facility: CLINIC | Age: 72
End: 2017-08-02

## 2017-08-02 NOTE — TELEPHONE ENCOUNTER
Called patient to see if he wishes to continue in smoking cessation program. Left message to call [phone] or [phone].

## 2017-08-10 ENCOUNTER — TELEPHONE (OUTPATIENT)
Dept: SMOKING CESSATION | Facility: CLINIC | Age: 72
End: 2017-08-10

## 2017-08-10 NOTE — TELEPHONE ENCOUNTER
Called patient to check up on his progress with tobacco cessation. He has either cancelled or no showed for groups. Could not leave message phone call was dropped.

## 2017-09-05 ENCOUNTER — OFFICE VISIT (OUTPATIENT)
Dept: UROLOGY | Facility: CLINIC | Age: 72
End: 2017-09-05
Payer: MEDICARE

## 2017-09-05 VITALS
SYSTOLIC BLOOD PRESSURE: 131 MMHG | DIASTOLIC BLOOD PRESSURE: 90 MMHG | WEIGHT: 182 LBS | BODY MASS INDEX: 25.48 KG/M2 | HEIGHT: 71 IN | HEART RATE: 94 BPM

## 2017-09-05 DIAGNOSIS — E55.9 VITAMIN D DEFICIENCY: ICD-10-CM

## 2017-09-05 DIAGNOSIS — N20.0 KIDNEY STONES: Primary | ICD-10-CM

## 2017-09-05 PROCEDURE — 3080F DIAST BP >= 90 MM HG: CPT | Mod: S$GLB,,, | Performed by: UROLOGY

## 2017-09-05 PROCEDURE — 99214 OFFICE O/P EST MOD 30 MIN: CPT | Mod: S$GLB,,, | Performed by: UROLOGY

## 2017-09-05 PROCEDURE — 1126F AMNT PAIN NOTED NONE PRSNT: CPT | Mod: S$GLB,,, | Performed by: UROLOGY

## 2017-09-05 PROCEDURE — 3075F SYST BP GE 130 - 139MM HG: CPT | Mod: S$GLB,,, | Performed by: UROLOGY

## 2017-09-05 PROCEDURE — 99999 PR PBB SHADOW E&M-EST. PATIENT-LVL III: CPT | Mod: PBBFAC,,, | Performed by: UROLOGY

## 2017-09-05 PROCEDURE — 1159F MED LIST DOCD IN RCRD: CPT | Mod: S$GLB,,, | Performed by: UROLOGY

## 2017-09-05 PROCEDURE — 3008F BODY MASS INDEX DOCD: CPT | Mod: S$GLB,,, | Performed by: UROLOGY

## 2017-09-05 NOTE — PROGRESS NOTES
Subjective:      Patient ID: Cesar Oliveira is a 72 y.o. male.    Chief Complaint: Follow-up (6 mos )    Patient is a 72 y.o. male who is established to our clinic and was initially referred by their PCP, Dr. Gaming for evaluation of gross hematuria and kidney stones.     HPI  Patient underwent right PCNL on 2/8/17.  Did well.  Subsequently had a stent removal and antegrade nephrostogram followed by nephrostomy tube removal.    Stones are calcium oxalate.  Patient doing well.  No gross hematuria.   No dysuria. No flank or abdominal pain.  No fevers or chills.  No nausea/vomiting.  Takes a multivitamin, but no vit D repletion therapy.   Has a h/o gout.  No attacks for many years.  Not on medications.  24 hour urine done in 3/2017--see below.       Review of Systems   All other systems reviewed and negative except pertinent positives noted in HPI.    Objective:     Physical Exam   Constitutional: He is oriented to person, place, and time. He appears well-developed and well-nourished. No distress.   HENT:   Head: Normocephalic and atraumatic.   Eyes: No scleral icterus.   Neck: No tracheal deviation present.   Pulmonary/Chest: Effort normal. No respiratory distress.   Neurological: He is alert and oriented to person, place, and time.   Psychiatric: He has a normal mood and affect. His behavior is normal. Judgment and thought content normal.     Assessment:     1. Kidney stones    2. Vitamin D deficiency      Plan:     1. Kidney stone:  -General risk factors for kidney stones and the conservative measures to prevent kidney stones in the future were discussed with the patient in detail.  The patient was encouraged to drink 2-3 liters of water a day, limit iced tea and angela as well as foods high in oxalate.  They were cautioned to try to limit salt and red meat intake.  We also discussed adding citrate to the diet with the addition of isauro or lemon juice to their water or alternatively with crystal light.     -Imaging  review: post-op CT reviewed.  Had ordered 6 month X-ray and ultrasound, but these were not performed and thus are not available for review.  They are scheduled for 9/21/17.  I will call or notify via the portal of these results.     -24 hour urine 3/27/17:  Urine volume:  1.4L  (you need to increase your fluid intake, goal urine volume >2.5 liters/day)  PH:  6  Urinary Calcium:  197   Urinary Oxalate:  121  (recommend dietary oxalate restriction)  Urinary citrate:  1312      Urinary uric acid: 613     Urinary sodium:  195       -labs:   uric acid-5.9 (normal)   PTH-59 (normal)   vit D-15 (LOW)        2. Vitamin D deficiency:  -recommend repletion by PCP.  Will defer to PCP but notify him via this note.  Encouraged patient to discuss this result with Dr. Gaming.

## 2017-09-21 ENCOUNTER — HOSPITAL ENCOUNTER (OUTPATIENT)
Dept: RADIOLOGY | Facility: HOSPITAL | Age: 72
Discharge: HOME | End: 2017-09-21
Attending: UROLOGY
Payer: MEDICARE

## 2017-09-21 DIAGNOSIS — N20.0 NEPHROLITHIASIS: ICD-10-CM

## 2017-09-21 PROCEDURE — 76770 US EXAM ABDO BACK WALL COMP: CPT | Mod: 26,,, | Performed by: RADIOLOGY

## 2017-09-21 PROCEDURE — 74000 XR ABDOMEN AP 1 VIEW: CPT | Mod: 26,,, | Performed by: RADIOLOGY

## 2017-09-21 PROCEDURE — 74000 XR ABDOMEN AP 1 VIEW: CPT | Mod: TC

## 2017-09-21 PROCEDURE — 76770 US EXAM ABDO BACK WALL COMP: CPT | Mod: TC

## 2017-11-13 DIAGNOSIS — I48.20 CHRONIC ATRIAL FIBRILLATION: ICD-10-CM

## 2017-11-13 RX ORDER — WARFARIN SODIUM 5 MG/1
TABLET ORAL
Qty: 90 TABLET | Refills: 3 | Status: SHIPPED | OUTPATIENT
Start: 2017-11-13 | End: 2017-11-27 | Stop reason: SDUPTHER

## 2017-11-13 NOTE — TELEPHONE ENCOUNTER
Patient held Warfarin from 10/31/17 to 11/03/17 for colonoscopy.  Resumed dose on 11/03/17.  Patients current dose of Warfarin 5 mg one tablet TWTSS and one & one-half tablets MF. Will continue current dose and repeat PT/INR in 2 weeks.

## 2017-11-20 ENCOUNTER — OFFICE VISIT (OUTPATIENT)
Dept: CARDIOLOGY | Facility: CLINIC | Age: 72
End: 2017-11-20
Payer: MEDICARE

## 2017-11-20 VITALS
HEART RATE: 95 BPM | HEIGHT: 71 IN | SYSTOLIC BLOOD PRESSURE: 122 MMHG | DIASTOLIC BLOOD PRESSURE: 80 MMHG | BODY MASS INDEX: 26.43 KG/M2 | WEIGHT: 188.81 LBS

## 2017-11-20 DIAGNOSIS — E78.5 HYPERLIPIDEMIA, UNSPECIFIED HYPERLIPIDEMIA TYPE: ICD-10-CM

## 2017-11-20 DIAGNOSIS — N20.0 KIDNEY STONE: ICD-10-CM

## 2017-11-20 DIAGNOSIS — Z79.01 WARFARIN ANTICOAGULATION: ICD-10-CM

## 2017-11-20 DIAGNOSIS — I48.20 CHRONIC ATRIAL FIBRILLATION: Primary | ICD-10-CM

## 2017-11-20 DIAGNOSIS — E11.9 TYPE 2 DIABETES MELLITUS WITHOUT COMPLICATION, WITHOUT LONG-TERM CURRENT USE OF INSULIN: ICD-10-CM

## 2017-11-20 PROCEDURE — 99213 OFFICE O/P EST LOW 20 MIN: CPT | Mod: S$GLB,,, | Performed by: INTERNAL MEDICINE

## 2017-11-20 PROCEDURE — 93000 ELECTROCARDIOGRAM COMPLETE: CPT | Mod: S$GLB,,, | Performed by: INTERNAL MEDICINE

## 2017-11-20 PROCEDURE — 99999 PR PBB SHADOW E&M-EST. PATIENT-LVL III: CPT | Mod: PBBFAC,,, | Performed by: INTERNAL MEDICINE

## 2017-11-20 RX ORDER — PIOGLITAZONEHYDROCHLORIDE 45 MG/1
45 TABLET ORAL DAILY
COMMUNITY

## 2017-11-20 NOTE — PROGRESS NOTES
Subjective:   Patient ID:  Cesar Oliveira is a 72 y.o. male     Chief Complaint   Patient presents with    Follow-up     A fib       HPI: Had kidney stones removed by Dr Schuster. Pt was advised to avoid oxalates.  Pt had over 30 polyps removed from colon by Dr Kunz.  Has to go back.    Review of Systems   Cardiovascular: Negative for chest pain, claudication, dyspnea on exertion, irregular heartbeat, leg swelling, near-syncope, orthopnea, palpitations and syncope.     Not very active.     Dr Gaming reduced the statin  Objective:   Physical Exam   Constitutional: He is oriented to person, place, and time. He appears well-developed and well-nourished. No distress.   HENT:   Head: Normocephalic.   Eyes: No scleral icterus.   Neck: No JVD present.   Cardiovascular: Normal rate and normal heart sounds.  An irregularly irregular rhythm present. Exam reveals no gallop and no friction rub.    No murmur heard.  Pulmonary/Chest: Effort normal. No stridor. He has wheezes in the right lower field.   Musculoskeletal: He exhibits no edema.   Neurological: He is alert and oriented to person, place, and time.   Skin: Skin is warm and dry. He is not diaphoretic.   Psychiatric: He has a normal mood and affect. His behavior is normal. Judgment and thought content normal.   Vitals reviewed.  Few scattered ronchi  Wt up 6 lbs    ECG: a fib with controlled rate, otherwise SNL      Recent INR was only 1.7 but pt had been holding the warfarin for his colonoscopy  Assessment:     1. Chronic atrial fibrillation    2. Hyperlipidemia, unspecified hyperlipidemia type    3. Kidney stone    4. Type 2 diabetes mellitus without complication, without long-term current use of insulin    5. Warfarin anticoagulation        Plan:     Pt encouraged to use the nicotine patches given to him by the smoking cessation class.  Smoking cessation counseled    Continue same medical regimen.  Return to clinic in 6 months with ECG.    Monthly INR

## 2018-04-23 ENCOUNTER — TELEPHONE (OUTPATIENT)
Dept: SMOKING CESSATION | Facility: CLINIC | Age: 73
End: 2018-04-23

## 2018-04-30 ENCOUNTER — TELEPHONE (OUTPATIENT)
Dept: UROLOGY | Facility: CLINIC | Age: 73
End: 2018-04-30

## 2018-04-30 DIAGNOSIS — N20.0 KIDNEY STONES: Primary | ICD-10-CM

## 2018-04-30 NOTE — TELEPHONE ENCOUNTER
----- Message from David Herrera LPN sent at 4/30/2018  2:58 PM CDT -----  Contact: Mobile: [phone]       ----- Message -----  From: Leeanna Ventura MA  Sent: 4/30/2018   1:22 PM  To: Landen Elizabeth Staff     Needs Medical Advice    Who Called: Cesar Oliveira  Symptoms (please be specific):  n/a  How long has patient had these symptoms:  n/a  Pharmacy name and phone # if needed:  n/a  Communication Preference (MyChart response to Pt. (or) Call Back # and timeframe): call pt at Mobile: [phone]   Additional Information: pt was seen in September 2017 for kidney stones, he said Dr Schuster wanted to see him back in 6 months but he does not have a recall and pt said he will probably need an xray, there are no orders in EPIC. Please respond to pt's call by phone. He cannot take an appt on May 7th, 14 or 17. He prefers morning appt's

## 2018-05-01 ENCOUNTER — TELEPHONE (OUTPATIENT)
Dept: UROLOGY | Facility: CLINIC | Age: 73
End: 2018-05-01

## 2018-05-01 ENCOUNTER — TELEPHONE (OUTPATIENT)
Dept: SMOKING CESSATION | Facility: CLINIC | Age: 73
End: 2018-05-01

## 2018-05-01 NOTE — TELEPHONE ENCOUNTER
Attempted to contact left message to return call to schedule U/S and KUB and RTC appointments  m fermin lpn

## 2018-05-03 ENCOUNTER — TELEPHONE (OUTPATIENT)
Dept: UROLOGY | Facility: CLINIC | Age: 73
End: 2018-05-03

## 2018-05-15 ENCOUNTER — TELEPHONE (OUTPATIENT)
Dept: SMOKING CESSATION | Facility: CLINIC | Age: 73
End: 2018-05-15

## 2018-06-04 ENCOUNTER — HOSPITAL ENCOUNTER (OUTPATIENT)
Dept: RADIOLOGY | Facility: HOSPITAL | Age: 73
Discharge: HOME | End: 2018-06-04
Attending: UROLOGY
Payer: MEDICARE

## 2018-06-04 ENCOUNTER — OFFICE VISIT (OUTPATIENT)
Dept: UROLOGY | Facility: CLINIC | Age: 73
End: 2018-06-04
Payer: MEDICARE

## 2018-06-04 VITALS
WEIGHT: 178 LBS | DIASTOLIC BLOOD PRESSURE: 76 MMHG | HEART RATE: 94 BPM | SYSTOLIC BLOOD PRESSURE: 119 MMHG | HEIGHT: 71 IN | BODY MASS INDEX: 24.92 KG/M2 | TEMPERATURE: 98 F

## 2018-06-04 DIAGNOSIS — N20.0 KIDNEY STONES: ICD-10-CM

## 2018-06-04 DIAGNOSIS — N20.0 KIDNEY STONES: Primary | ICD-10-CM

## 2018-06-04 PROCEDURE — 82365 CALCULUS SPECTROSCOPY: CPT

## 2018-06-04 PROCEDURE — 99999 PR PBB SHADOW E&M-EST. PATIENT-LVL III: CPT | Mod: PBBFAC,,, | Performed by: UROLOGY

## 2018-06-04 PROCEDURE — 74018 RADEX ABDOMEN 1 VIEW: CPT | Mod: 26,,, | Performed by: INTERNAL MEDICINE

## 2018-06-04 PROCEDURE — 74018 RADEX ABDOMEN 1 VIEW: CPT | Mod: TC,FY

## 2018-06-04 PROCEDURE — 76770 US EXAM ABDO BACK WALL COMP: CPT | Mod: TC

## 2018-06-04 PROCEDURE — 3074F SYST BP LT 130 MM HG: CPT | Mod: CPTII,S$GLB,, | Performed by: UROLOGY

## 2018-06-04 PROCEDURE — 3078F DIAST BP <80 MM HG: CPT | Mod: CPTII,S$GLB,, | Performed by: UROLOGY

## 2018-06-04 PROCEDURE — 76770 US EXAM ABDO BACK WALL COMP: CPT | Mod: 26,,, | Performed by: INTERNAL MEDICINE

## 2018-06-04 PROCEDURE — 99214 OFFICE O/P EST MOD 30 MIN: CPT | Mod: S$GLB,,, | Performed by: UROLOGY

## 2018-06-04 NOTE — PROGRESS NOTES
Subjective:      Patient ID: Cesar Oliveira is a 73 y.o. male.    Chief Complaint: Other (6 omth u/s kub resutls)    Patient is a 73 y.o. male who is established to our clinic and was initially referred by their PCP, Dr. Gaming for evaluation of gross hematuria and kidney stones.     HPI  Patient underwent right PCNL on 2/8/17.  Did well.  Subsequently had a stent removal and antegrade nephrostogram followed by nephrostomy tube removal.    Stones are calcium oxalate.  Patient doing well.  No gross hematuria.   No dysuria. No flank or abdominal pain.  No fevers or chills.  No nausea/vomiting.      Has a h/o gout.  No attacks for many years.  Not on medications.  24 hour urine done in 3/2017--see below.       Review of Systems   All other systems reviewed and negative except pertinent positives noted in HPI.    Objective:     Physical Exam   Constitutional: He is oriented to person, place, and time. He appears well-developed and well-nourished. No distress.   HENT:   Head: Normocephalic and atraumatic.   Eyes: No scleral icterus.   Neck: No tracheal deviation present.   Pulmonary/Chest: Effort normal. No respiratory distress.   Neurological: He is alert and oriented to person, place, and time.   Psychiatric: He has a normal mood and affect. His behavior is normal. Judgment and thought content normal.     Assessment:     1. Kidney stones      Plan:     1. Kidney stone:  -General risk factors for kidney stones and the conservative measures to prevent kidney stones in the future were discussed with the patient in detail.  The patient was encouraged to drink 2-3 liters of water a day, limit iced tea and angela as well as foods high in oxalate.  They were cautioned to try to limit salt and red meat intake.  We also discussed adding citrate to the diet with the addition of isauro or lemon juice to their water or alternatively with crystal light.     -Imaging review: renal US shows small renal stones in the right, no  hydronephrosis.     -24 hour urine 3/27/17:  Urine volume:  1.4L  (you need to increase your fluid intake, goal urine volume >2.5 liters/day)  PH:  6  Urinary Calcium:  197   Urinary Oxalate:  121  (recommend dietary oxalate restriction)  Urinary citrate:  1312      Urinary uric acid: 613     Urinary sodium:  195         Repeat 24 hour urine--ordered.   -f/u in multi-disciplinary clinic with dietary and nephrology.  F/u in 6 months with a CT RSS.     I spent 25 minutes with the patient of which more than half was spent in direct consultation with the patient in regards to our treatment and plan.

## 2018-06-07 LAB
ANNOTATION COMMENT IMP: NORMAL
COMPN STONE: NORMAL
SPECIMEN SOURCE: NORMAL
STONE ANALYSIS IR-IMP: NORMAL

## 2018-07-27 ENCOUNTER — LAB VISIT (OUTPATIENT)
Dept: LAB | Facility: HOSPITAL | Age: 73
End: 2018-07-27
Attending: UROLOGY
Payer: MEDICARE

## 2018-07-27 DIAGNOSIS — N20.0 KIDNEY STONES: ICD-10-CM

## 2018-07-27 PROCEDURE — 82507 ASSAY OF CITRATE: CPT

## 2018-07-27 PROCEDURE — 84300 ASSAY OF URINE SODIUM: CPT

## 2018-08-06 ENCOUNTER — NUTRITION (OUTPATIENT)
Dept: MULTI SPECIALTY CLINIC | Facility: CLINIC | Age: 73
End: 2018-08-06
Payer: MEDICARE

## 2018-08-06 ENCOUNTER — LAB VISIT (OUTPATIENT)
Dept: LAB | Facility: HOSPITAL | Age: 73
End: 2018-08-06
Attending: INTERNAL MEDICINE
Payer: MEDICARE

## 2018-08-06 ENCOUNTER — PATIENT MESSAGE (OUTPATIENT)
Dept: NEPHROLOGY | Facility: CLINIC | Age: 73
End: 2018-08-06

## 2018-08-06 ENCOUNTER — INITIAL CONSULT (OUTPATIENT)
Dept: MULTI SPECIALTY CLINIC | Facility: CLINIC | Age: 73
End: 2018-08-06
Payer: MEDICARE

## 2018-08-06 VITALS — WEIGHT: 177.94 LBS | HEIGHT: 71 IN | BODY MASS INDEX: 24.91 KG/M2

## 2018-08-06 VITALS
HEART RATE: 93 BPM | OXYGEN SATURATION: 98 % | SYSTOLIC BLOOD PRESSURE: 118 MMHG | DIASTOLIC BLOOD PRESSURE: 80 MMHG | WEIGHT: 178 LBS | HEIGHT: 71 IN | BODY MASS INDEX: 24.92 KG/M2

## 2018-08-06 DIAGNOSIS — E11.9 TYPE 2 DIABETES MELLITUS WITHOUT COMPLICATION, WITHOUT LONG-TERM CURRENT USE OF INSULIN: ICD-10-CM

## 2018-08-06 DIAGNOSIS — E55.9 VITAMIN D DEFICIENCY: ICD-10-CM

## 2018-08-06 DIAGNOSIS — N18.30 CKD (CHRONIC KIDNEY DISEASE), STAGE III: ICD-10-CM

## 2018-08-06 DIAGNOSIS — N39.0 UTI (URINARY TRACT INFECTION), UNCOMPLICATED: ICD-10-CM

## 2018-08-06 DIAGNOSIS — N20.0 NEPHROLITHIASIS: ICD-10-CM

## 2018-08-06 DIAGNOSIS — Z71.3 NUTRITIONAL COUNSELING: Primary | ICD-10-CM

## 2018-08-06 DIAGNOSIS — N20.0 NEPHROLITHIASIS: Primary | ICD-10-CM

## 2018-08-06 LAB
ALBUMIN SERPL BCP-MCNC: 3.8 G/DL
ANION GAP SERPL CALC-SCNC: 8 MMOL/L
BASOPHILS # BLD AUTO: 0.04 K/UL
BASOPHILS NFR BLD: 0.5 %
BUN SERPL-MCNC: 13 MG/DL
CALCIUM SERPL-MCNC: 9.4 MG/DL
CHLORIDE SERPL-SCNC: 106 MMOL/L
CO2 SERPL-SCNC: 25 MMOL/L
CREAT SERPL-MCNC: 1 MG/DL
DIFFERENTIAL METHOD: NORMAL
EOSINOPHIL # BLD AUTO: 0.4 K/UL
EOSINOPHIL NFR BLD: 4.8 %
ERYTHROCYTE [DISTWIDTH] IN BLOOD BY AUTOMATED COUNT: 13.5 %
EST. GFR  (AFRICAN AMERICAN): >60 ML/MIN/1.73 M^2
EST. GFR  (NON AFRICAN AMERICAN): >60 ML/MIN/1.73 M^2
GLUCOSE SERPL-MCNC: 134 MG/DL
HCT VFR BLD AUTO: 49.4 %
HGB BLD-MCNC: 16.1 G/DL
IMM GRANULOCYTES # BLD AUTO: 0.02 K/UL
IMM GRANULOCYTES NFR BLD AUTO: 0.2 %
LYMPHOCYTES # BLD AUTO: 2.3 K/UL
LYMPHOCYTES NFR BLD: 28.5 %
MAGNESIUM SERPL-MCNC: 1.9 MG/DL
MCH RBC QN AUTO: 31 PG
MCHC RBC AUTO-ENTMCNC: 32.6 G/DL
MCV RBC AUTO: 95 FL
MONOCYTES # BLD AUTO: 0.7 K/UL
MONOCYTES NFR BLD: 8 %
NEUTROPHILS # BLD AUTO: 4.8 K/UL
NEUTROPHILS NFR BLD: 58 %
NRBC BLD-RTO: 0 /100 WBC
PHOSPHATE SERPL-MCNC: 2.5 MG/DL
PLATELET # BLD AUTO: 179 K/UL
PMV BLD AUTO: 10 FL
POTASSIUM SERPL-SCNC: 4 MMOL/L
PTH-INTACT SERPL-MCNC: 109 PG/ML
RBC # BLD AUTO: 5.2 M/UL
SODIUM SERPL-SCNC: 139 MMOL/L
STONE ANALYSIS-IMP: NORMAL
URATE SERPL-MCNC: 5.6 MG/DL
WBC # BLD AUTO: 8.21 K/UL

## 2018-08-06 PROCEDURE — 99204 OFFICE O/P NEW MOD 45 MIN: CPT | Mod: S$GLB,,, | Performed by: INTERNAL MEDICINE

## 2018-08-06 PROCEDURE — 3079F DIAST BP 80-89 MM HG: CPT | Mod: CPTII,S$GLB,, | Performed by: INTERNAL MEDICINE

## 2018-08-06 PROCEDURE — 85025 COMPLETE CBC W/AUTO DIFF WBC: CPT

## 2018-08-06 PROCEDURE — 83970 ASSAY OF PARATHORMONE: CPT

## 2018-08-06 PROCEDURE — 36415 COLL VENOUS BLD VENIPUNCTURE: CPT

## 2018-08-06 PROCEDURE — 84550 ASSAY OF BLOOD/URIC ACID: CPT

## 2018-08-06 PROCEDURE — 99999 PR PBB SHADOW E&M-EST. PATIENT-LVL II: CPT | Mod: PBBFAC,,, | Performed by: DIETITIAN, REGISTERED

## 2018-08-06 PROCEDURE — 97802 MEDICAL NUTRITION INDIV IN: CPT | Mod: S$GLB,,, | Performed by: DIETITIAN, REGISTERED

## 2018-08-06 PROCEDURE — 80069 RENAL FUNCTION PANEL: CPT

## 2018-08-06 PROCEDURE — 99999 PR PBB SHADOW E&M-EST. PATIENT-LVL III: CPT | Mod: PBBFAC,,, | Performed by: INTERNAL MEDICINE

## 2018-08-06 PROCEDURE — 83735 ASSAY OF MAGNESIUM: CPT

## 2018-08-06 PROCEDURE — 3074F SYST BP LT 130 MM HG: CPT | Mod: CPTII,S$GLB,, | Performed by: INTERNAL MEDICINE

## 2018-08-06 RX ORDER — WARFARIN 7.5 MG/1
TABLET ORAL
COMMUNITY
Start: 2018-06-19 | End: 2020-06-08

## 2018-08-06 NOTE — PROGRESS NOTES
"Referring Physician:Glenn Schuster MD     Reason for visit:  Chief Complaint   Patient presents with    Nutrition Counseling        :1945     Allergies Reviewed  Meds Reviewed    Anthropometrics  Weight:80.7 kg (177 lb 14.6 oz)  Height:5' 11" (1.803 m)  BMI:Body mass index is 24.81 kg/m².   IBW: 172#    Meds:  Outpatient Medications Prior to Visit   Medication Sig Dispense Refill    metformin (GLUCOPHAGE) 1000 MG tablet Take 1,000 mg by mouth every evening.       multivitamin (ONE DAILY MULTIVITAMIN) per tablet Take 1 tablet by mouth once daily.      pioglitazone (ACTOS) 45 MG tablet Take 45 mg by mouth once daily.      PREVNAR 13, PF, 0.5 mL Syrg TO BE ADMINISTERED BY PHARMACIST FOR IMMUNIZATION  0    rosuvastatin (CRESTOR) 20 MG tablet Take 10 mg by mouth once daily.       verapamil (VERELAN) 120 MG C24P TAKE ONE CAPSULE BY MOUTH ONCE DAILY 30 capsule 11    warfarin (COUMADIN) 5 MG tablet TAKE ONE TABLET BY MOUTH ONCE DAILY 90 tablet 3    warfarin (COUMADIN) 7.5 MG tablet        No facility-administered medications prior to visit.          Labs: urine volume 1.7L, urinary oxalate 121H     Estimated Nutrition Needs: 2000 Kcals/day (25 kcal/kg),  80 g protein (1.0 g/kg)     Diet Hx: Pt here for nutrition counseling in kidney stone clinic. Pt with stable weight around 180#. Reports good appetite; eats 2 meals/day (skips lunch). Pt lives alone and cooks for himself. Doesn't eat fruits or vegetables often. Pt states he eats sausage often. 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 Pt has diabetes--takes Metformin with blood sugars between . Pt states his A1C was previously 10% and has dropped to 8%.      Breakfast: 2 biscuits, 1 glass V8, coffee  Lunch: skips  Dinner: beans, rice, sausage or spaghetti with meatballs  Snack: potato chips, crackers, banana  Drinks: diet soda (4-5 cans), " water, coffee, V8    Assessment: Discussed importance of increasing water intake. Set goal to increase water to 4-6 cups/day and decrease diet soda to 2 cans/day. Reviewed which foods contain high amounts of sodium. Discussed ways to decrease sodium intake: reading nutrition labels, limiting food consumed away from home, and using Mrs. Gomez instead of Jyothi. Encouraged pt to limit sausage and other red meats and choose chicken, turkey, and fish more often. Provided pt with list of high vs low oxalate foods. Encouraged pt to decrease frequency and portion size of high oxalate foods and to consume calcium foods along with oxalate foods to help with binding. Limit oxalate to 200 mg each day with 300 mg of calcium at each meal. Explained that most calcium comes from dairy products which he is restricting at this time. Answered questions and provided contact information.    Nutrition Diagnosis: inadequate fluid intake related to food and nutrition related knowledge deficit as evidenced by urine volume 1.7L    Recommendations: Recommend limiting sodium to 1500 mg/day and oxalate to 200 mg/day. Pt to consume 300 mg calcium/day. Pt to consume at least 2.5-3 L water/day.      Consultation Time:40 minutes.    Follow Up: PRN

## 2018-08-06 NOTE — PROGRESS NOTES
Subjective:       Patient ID: Cesar Oliveira is a 73 y.o. White male who presents for new evaluation of No chief complaint on file.  The patient has a history of nephrolithiasis for 2-3 yrs and gout (no attack in yrs), DM x 10yrs, Afib (on coumadin). hyperlipidemia. Last underwent right PCNL on 2/8/17. Subsequently had a stent removal and antegrade nephrostogram followed by nephrostomy tube removal. Also had an elevated creatinine in 2/2018,  Last Stone was 100% calcium oxalate. Had lithotripsy a couple of yrs ago. He states that he continuously is passing small stones (20 tiny ones last week). The patient has no family history of kidney disease, no history of kidney stones. The patient does not freuqently use NSAIDS or herbal supplements.           24 hour urine 3/27/17:  Urine volume:  1.4L  (you need to increase your fluid intake, goal urine volume >2.5 liters/day)  PH:  6  Urinary Calcium:  197   Urinary Oxalate:  121  (recommend dietary oxalate restriction)  Urinary citrate:  1312      Urinary uric acid: 613     Urinary sodium:  195        HPI  Review of Systems   Constitutional: Negative.    HENT: Negative.    Eyes: Negative.    Respiratory: Negative.    Cardiovascular: Negative.    Gastrointestinal: Negative.  Negative for diarrhea, nausea and vomiting.   Genitourinary: Positive for dysuria. Negative for decreased urine volume, difficulty urinating, hematuria and urgency.   Musculoskeletal: Negative.    Skin: Negative.    Neurological: Negative.    Psychiatric/Behavioral: Negative.        Objective:      Physical Exam   Constitutional: He is oriented to person, place, and time. He appears well-developed and well-nourished.   HENT:   Head: Normocephalic and atraumatic.   Right Ear: External ear normal.   Left Ear: External ear normal.   Nose: Nose normal.   Mouth/Throat: Oropharynx is clear and moist.   Eyes: Conjunctivae and EOM are normal. Pupils are equal, round, and reactive to light.   Neck: Normal range  of motion. Neck supple. No JVD present.   Cardiovascular: Normal rate, normal heart sounds and intact distal pulses.    irregular   Pulmonary/Chest: Effort normal and breath sounds normal. No stridor. No respiratory distress. He has no rales. He exhibits no tenderness.   Abdominal: Soft. Bowel sounds are normal. He exhibits no distension and no mass. There is no tenderness. There is no rebound and no guarding.   Musculoskeletal: Normal range of motion. He exhibits no edema.   Lymphadenopathy:     He has no cervical adenopathy.   Neurological: He is alert and oriented to person, place, and time. He has normal reflexes. No cranial nerve deficit. Coordination normal.   Skin: Skin is warm and dry.   Some nevi   Psychiatric: He has a normal mood and affect. His behavior is normal. Judgment and thought content normal.   Nursing note and vitals reviewed.      Assessment:       1. Nephrolithiasis    2. Type 2 diabetes mellitus without complication, without long-term current use of insulin    3. Vitamin D deficiency        Plan:       1. Nephrolithiais: High oxalate, normal PTH, low vitamin D.  - labs today (renal profile, PTH, uric acid)  - await stone profile  - will see dietician  Recommendations below:  - Sufficient fluid intake distributed throughout the day to produce at least 2 liters of urine per day, including drinking at night   - Avoiding excessive animal protein in the diet.   - Limiting dietary sodium to 100 meq/day.    - Increasing dietary potassium intake   - Limiting dietary sucrose and fructose.  - Limiting dietary oxalate and vitamin C.       Lab Results   Component Value Date    CREATININE 1.8 (H) 02/13/2017     Protein Creatinine Ratios:    No results found for: UTPCR  ·   ·   Acid-Base:   Lab Results   Component Value Date     02/13/2017    K 4.1 02/13/2017    CO2 26 02/13/2017        2. Renal osteodystrophy: last PTH wnl, will start   Lab Results   Component Value Date    PTH 59.0 03/21/2017     CALCIUM 10.2 02/13/2017      Needs to see Dermatology once a year    Follow up in 6 month with labs and stone profile

## 2018-08-06 NOTE — LETTER
August 6, 2018      Glenn Schuster MD  [address]  [facility] [address]           [facility]  [address]  [facility] [address]  Phone: [phone]  Fax: [phone]          Patient: Cesar Oliveira   MR Number: [id number]   YOB: 1945   Date of Visit: 8/6/2018       Dear Dr. Glenn Schuster:    Thank you for referring Cesar Oliveira to me for evaluation. Attached you will find relevant portions of my assessment and plan of care.    If you have questions, please do not hesitate to call me. I look forward to following Cesar Oliveira along with you.    Sincerely,    Dayanara Membreno MD    Enclosure  CC:  No Recipients    If you would like to receive this communication electronically, please contact [email or web address] or [phone] to request more information on [employer] Link access.    For providers and/or their staff who would like to refer a patient to [facility], please contact us through our one-stop-shop provider referral line, [facility], at [phone].    If you feel you have received this communication in error or would no longer like to receive these types of communications, please e-mail [email or web address]

## 2018-08-06 NOTE — PATIENT INSTRUCTIONS
Please take 2000 units vitamin D over the counter daily. Please see me back in my clinic in 6 month with blood work.       Stone therapy:  - Sufficient fluid intake distributed throughout the day to produce at least 2 ( 66 oz) liters of urine per day, including drinking at night   - Avoiding excessive animal protein in the diet.   - Limiting dietary sodium to 100 meq/day.    - Increasing dietary potassium intake   - Limiting dietary sucrose and fructose.  - Limiting dietary oxalate and vitamin C.

## 2019-01-09 ENCOUNTER — HOSPITAL ENCOUNTER (OUTPATIENT)
Dept: RADIOLOGY | Facility: HOSPITAL | Age: 74
Discharge: HOME | End: 2019-01-09
Attending: UROLOGY
Payer: MEDICARE

## 2019-01-09 DIAGNOSIS — N20.0 KIDNEY STONES: ICD-10-CM

## 2019-01-09 PROCEDURE — 74176 CT ABD & PELVIS W/O CONTRAST: CPT | Mod: TC

## 2019-01-09 PROCEDURE — 74176 CT RENAL STONE STUDY ABD PELVIS WO: ICD-10-PCS | Mod: 26,,, | Performed by: RADIOLOGY

## 2019-01-09 PROCEDURE — 74176 CT ABD & PELVIS W/O CONTRAST: CPT | Mod: 26,,, | Performed by: RADIOLOGY

## 2019-01-16 ENCOUNTER — OFFICE VISIT (OUTPATIENT)
Dept: UROLOGY | Facility: CLINIC | Age: 74
End: 2019-01-16
Payer: MEDICARE

## 2019-01-16 VITALS
HEART RATE: 102 BPM | WEIGHT: 170 LBS | DIASTOLIC BLOOD PRESSURE: 73 MMHG | BODY MASS INDEX: 23.71 KG/M2 | SYSTOLIC BLOOD PRESSURE: 103 MMHG

## 2019-01-16 DIAGNOSIS — N20.0 KIDNEY STONES: Primary | ICD-10-CM

## 2019-01-16 PROCEDURE — 99214 OFFICE O/P EST MOD 30 MIN: CPT | Mod: S$GLB,,, | Performed by: UROLOGY

## 2019-01-16 PROCEDURE — 3074F PR MOST RECENT SYSTOLIC BLOOD PRESSURE < 130 MM HG: ICD-10-PCS | Mod: CPTII,S$GLB,, | Performed by: UROLOGY

## 2019-01-16 PROCEDURE — 1101F PT FALLS ASSESS-DOCD LE1/YR: CPT | Mod: CPTII,S$GLB,, | Performed by: UROLOGY

## 2019-01-16 PROCEDURE — 99214 PR OFFICE/OUTPT VISIT, EST, LEVL IV, 30-39 MIN: ICD-10-PCS | Mod: S$GLB,,, | Performed by: UROLOGY

## 2019-01-16 PROCEDURE — 99999 PR PBB SHADOW E&M-EST. PATIENT-LVL III: CPT | Mod: PBBFAC,,, | Performed by: UROLOGY

## 2019-01-16 PROCEDURE — 1101F PR PT FALLS ASSESS DOC 0-1 FALLS W/OUT INJ PAST YR: ICD-10-PCS | Mod: CPTII,S$GLB,, | Performed by: UROLOGY

## 2019-01-16 PROCEDURE — 3074F SYST BP LT 130 MM HG: CPT | Mod: CPTII,S$GLB,, | Performed by: UROLOGY

## 2019-01-16 PROCEDURE — 3078F DIAST BP <80 MM HG: CPT | Mod: CPTII,S$GLB,, | Performed by: UROLOGY

## 2019-01-16 PROCEDURE — 3078F PR MOST RECENT DIASTOLIC BLOOD PRESSURE < 80 MM HG: ICD-10-PCS | Mod: CPTII,S$GLB,, | Performed by: UROLOGY

## 2019-01-16 PROCEDURE — 99999 PR PBB SHADOW E&M-EST. PATIENT-LVL III: ICD-10-PCS | Mod: PBBFAC,,, | Performed by: UROLOGY

## 2019-01-16 NOTE — PROGRESS NOTES
Subjective:      Patient ID: Cesar Oliveira is a 73 y.o. male.    Chief Complaint: kidney stones.    Patient is a 73 y.o. male who is established to our clinic and was initially referred by their PCP, Dr. Gaming for evaluation of gross hematuria and kidney stones.     HPI  Patient underwent right PCNL on 2/8/17.   Stones are calcium oxalate.  Postoperative imaging showed minimal punctate renal stone on the right side from a CT dating 2/9/17.    The patient returns today to review results of a recent CT scan.  He has passed multiple small stones since last time we saw him.  Patient doing well.  No gross hematuria.   No dysuria.  He has noted some minor right flank pain.  Minimally bothersome to him.  Rated as 1-2/10.  Past month.  Passed some small stones. .  No fevers or chills.  No nausea/vomiting.      Has a h/o gout.  No attacks for many years.  Not on medications.  Multiple 24 hr urine collections performed in the past.  Consistently has extremely low urine volume.  Of note, his oxalate level significantly decreased on his last 24 hr collection.          Review of Systems   All other systems reviewed and negative except pertinent positives noted in HPI.    Objective:     Physical Exam   Constitutional: He is oriented to person, place, and time. He appears well-developed and well-nourished. No distress.   HENT:   Head: Normocephalic and atraumatic.   Eyes: No scleral icterus.   Neck: No tracheal deviation present.   Pulmonary/Chest: Effort normal. No respiratory distress.   Neurological: He is alert and oriented to person, place, and time.   Psychiatric: He has a normal mood and affect. His behavior is normal. Judgment and thought content normal.     Assessment:     1. Kidney stones      Plan:     1. Kidney stone:  -General risk factors for kidney stones and the conservative measures to prevent kidney stones in the future were discussed with the patient in detail.  The patient was encouraged to drink 2-3 liters of  water a day, limit iced tea and angela as well as foods high in oxalate.  They were cautioned to try to limit salt and red meat intake.  We also discussed adding citrate to the diet with the addition of isauro or lemon juice to their water or alternatively with crystal light.     -Imaging review:  CT renal stone study shows an increase in stone burden in the lower pole of the right kidney.  No stones in the left kidney.  No hydronephrosis.        Repeat 24 hour urine--ordered.     F/u in 6 months with a ultrasound & KUB    I spent 25 minutes with the patient of which more than half was spent in direct consultation with the patient in regards to our treatment and plan.

## 2019-08-16 ENCOUNTER — LAB VISIT (OUTPATIENT)
Dept: LAB | Facility: HOSPITAL | Age: 74
End: 2019-08-16
Attending: UROLOGY
Payer: MEDICARE

## 2019-08-16 DIAGNOSIS — N20.0 KIDNEY STONES: ICD-10-CM

## 2019-08-16 PROCEDURE — 83735 ASSAY OF MAGNESIUM: CPT

## 2019-08-16 PROCEDURE — 84300 ASSAY OF URINE SODIUM: CPT

## 2019-08-21 ENCOUNTER — HOSPITAL ENCOUNTER (OUTPATIENT)
Dept: RADIOLOGY | Facility: HOSPITAL | Age: 74
Discharge: HOME | End: 2019-08-21
Attending: UROLOGY
Payer: MEDICARE

## 2019-08-21 ENCOUNTER — OFFICE VISIT (OUTPATIENT)
Dept: UROLOGY | Facility: CLINIC | Age: 74
End: 2019-08-21
Payer: MEDICARE

## 2019-08-21 VITALS — WEIGHT: 166.25 LBS | BODY MASS INDEX: 23.27 KG/M2 | HEIGHT: 71 IN

## 2019-08-21 DIAGNOSIS — N20.0 KIDNEY STONES: ICD-10-CM

## 2019-08-21 DIAGNOSIS — N20.0 KIDNEY STONES: Primary | ICD-10-CM

## 2019-08-21 PROCEDURE — 74018 RADEX ABDOMEN 1 VIEW: CPT | Mod: TC,FY

## 2019-08-21 PROCEDURE — 76770 US RETROPERITONEAL COMPLETE: ICD-10-PCS | Mod: 26,,, | Performed by: RADIOLOGY

## 2019-08-21 PROCEDURE — 76770 US EXAM ABDO BACK WALL COMP: CPT | Mod: TC

## 2019-08-21 PROCEDURE — 76770 US EXAM ABDO BACK WALL COMP: CPT | Mod: 26,,, | Performed by: RADIOLOGY

## 2019-08-21 PROCEDURE — 74018 RADEX ABDOMEN 1 VIEW: CPT | Mod: 26,,, | Performed by: RADIOLOGY

## 2019-08-21 PROCEDURE — 1101F PR PT FALLS ASSESS DOC 0-1 FALLS W/OUT INJ PAST YR: ICD-10-PCS | Mod: CPTII,S$GLB,, | Performed by: UROLOGY

## 2019-08-21 PROCEDURE — 1101F PT FALLS ASSESS-DOCD LE1/YR: CPT | Mod: CPTII,S$GLB,, | Performed by: UROLOGY

## 2019-08-21 PROCEDURE — 99214 PR OFFICE/OUTPT VISIT, EST, LEVL IV, 30-39 MIN: ICD-10-PCS | Mod: S$GLB,,, | Performed by: UROLOGY

## 2019-08-21 PROCEDURE — 99214 OFFICE O/P EST MOD 30 MIN: CPT | Mod: S$GLB,,, | Performed by: UROLOGY

## 2019-08-21 PROCEDURE — 99999 PR PBB SHADOW E&M-EST. PATIENT-LVL III: CPT | Mod: PBBFAC,,, | Performed by: UROLOGY

## 2019-08-21 PROCEDURE — 99999 PR PBB SHADOW E&M-EST. PATIENT-LVL III: ICD-10-PCS | Mod: PBBFAC,,, | Performed by: UROLOGY

## 2019-08-21 PROCEDURE — 74018 XR ABDOMEN AP 1 VIEW: ICD-10-PCS | Mod: 26,,, | Performed by: RADIOLOGY

## 2019-08-21 RX ORDER — ALFUZOSIN HYDROCHLORIDE 10 MG/1
10 TABLET, EXTENDED RELEASE ORAL
Qty: 30 TABLET | Refills: 12 | Status: SHIPPED | OUTPATIENT
Start: 2019-08-21 | End: 2019-09-20

## 2019-08-21 NOTE — PROGRESS NOTES
"Subjective:      Patient ID: Cesar Oliveira is a 74 y.o. male.    Chief Complaint: kidney stones.    Patient is a 74 y.o. male who is established to our clinic and was initially referred by their PCP, Dr. Gaming for evaluation of gross hematuria and kidney stones.     HPI  Patient underwent right PCNL on 2/8/17.   Stones are calcium oxalate.  Postoperative imaging showed minimal punctate renal stone on the right side from a CT dating 2/9/17.    The patient returns today to review results of a recent renal US.  He has passed multiple small stones since last time we saw him.  He passed a large stone last week without significant pain.  Patient doing well.  No gross hematuria.   No dysuria.  No fevers or chills.  No nausea/vomiting.      Has a h/o gout.  No attacks for many years.  Not on medications.  Multiple 24 hr urine collections performed in the past---currently awaiting result from last week "in process" in Epic.  Consistently has extremely low urine volume.     Was recently started on flomax after a physical exam showed "enlarged prostate".  He noticed improvement in his voiding but had severe dizzyness so he d/c'd the flomax.         Review of Systems   All other systems reviewed and negative except pertinent positives noted in HPI.    Objective:     Physical Exam   Constitutional: He is oriented to person, place, and time. He appears well-developed and well-nourished. No distress.   HENT:   Head: Normocephalic and atraumatic.   Eyes: No scleral icterus.   Neck: No tracheal deviation present.   Pulmonary/Chest: Effort normal. No respiratory distress.   Neurological: He is alert and oriented to person, place, and time.   Psychiatric: He has a normal mood and affect. His behavior is normal. Judgment and thought content normal.     Assessment:     1. Kidney stones      Plan:     1. Kidney stone:  -General risk factors for kidney stones and the conservative measures to prevent kidney stones in the future were " "discussed with the patient in detail.  The patient was encouraged to drink 2-3 liters of water a day, limit iced tea and angela as well as foods high in oxalate.  They were cautioned to try to limit salt and red meat intake.  We also discussed adding citrate to the diet with the addition of isauro or lemon juice to their water or alternatively with crystal light.     -Imaging review:  Ultrasound was independently reviewed today and reveals right renal stones. No hydronephrosis.  KUB was independently reviewed today and reveals "faint calcification of right kidney" per radiology although I was not able to appreciate this on my evaluation.         Repeat 24 hour urine--IN PROCESS---will call with results.     F/u in 12 months with a CT RSS      -will try uroxatral as alternative to flomax.  Side effects discussed.  Asked that he call with any questions and discontinue uroxatral if he develops symptoms.         "

## 2019-08-27 LAB — STONE ANALYSIS-IMP: NORMAL

## 2020-06-08 ENCOUNTER — OFFICE VISIT (OUTPATIENT)
Dept: UROLOGY | Facility: CLINIC | Age: 75
End: 2020-06-08
Payer: MEDICARE

## 2020-06-08 ENCOUNTER — LAB VISIT (OUTPATIENT)
Dept: LAB | Facility: HOSPITAL | Age: 75
End: 2020-06-08
Attending: UROLOGY
Payer: MEDICARE

## 2020-06-08 VITALS
HEIGHT: 71 IN | SYSTOLIC BLOOD PRESSURE: 118 MMHG | WEIGHT: 158 LBS | BODY MASS INDEX: 22.12 KG/M2 | HEART RATE: 73 BPM | DIASTOLIC BLOOD PRESSURE: 85 MMHG

## 2020-06-08 DIAGNOSIS — N20.0 KIDNEY STONES: ICD-10-CM

## 2020-06-08 DIAGNOSIS — R79.89 INCREASED PTH LEVEL: ICD-10-CM

## 2020-06-08 DIAGNOSIS — E55.9 VITAMIN D DEFICIENCY: ICD-10-CM

## 2020-06-08 DIAGNOSIS — I10 ESSENTIAL HYPERTENSION: ICD-10-CM

## 2020-06-08 DIAGNOSIS — N20.0 KIDNEY STONES: Primary | ICD-10-CM

## 2020-06-08 LAB
25(OH)D3+25(OH)D2 SERPL-MCNC: 20 NG/ML (ref 30–96)
ANION GAP SERPL CALC-SCNC: 7 MMOL/L (ref 8–16)
BUN SERPL-MCNC: 12 MG/DL (ref 8–23)
CALCIUM SERPL-MCNC: 9.8 MG/DL (ref 8.7–10.5)
CHLORIDE SERPL-SCNC: 107 MMOL/L (ref 95–110)
CO2 SERPL-SCNC: 27 MMOL/L (ref 23–29)
CREAT SERPL-MCNC: 1.1 MG/DL (ref 0.5–1.4)
EST. GFR  (AFRICAN AMERICAN): >60 ML/MIN/1.73 M^2
EST. GFR  (NON AFRICAN AMERICAN): >60 ML/MIN/1.73 M^2
GLUCOSE SERPL-MCNC: 140 MG/DL (ref 70–110)
POTASSIUM SERPL-SCNC: 4.4 MMOL/L (ref 3.5–5.1)
PTH-INTACT SERPL-MCNC: 141 PG/ML (ref 9–77)
SODIUM SERPL-SCNC: 141 MMOL/L (ref 136–145)
URATE SERPL-MCNC: 5.6 MG/DL (ref 3.4–7)

## 2020-06-08 PROCEDURE — 99214 PR OFFICE/OUTPT VISIT, EST, LEVL IV, 30-39 MIN: ICD-10-PCS | Mod: S$GLB,,, | Performed by: UROLOGY

## 2020-06-08 PROCEDURE — 84550 ASSAY OF BLOOD/URIC ACID: CPT

## 2020-06-08 PROCEDURE — 99999 PR PBB SHADOW E&M-EST. PATIENT-LVL III: CPT | Mod: PBBFAC,,, | Performed by: UROLOGY

## 2020-06-08 PROCEDURE — 3074F SYST BP LT 130 MM HG: CPT | Mod: CPTII,S$GLB,, | Performed by: UROLOGY

## 2020-06-08 PROCEDURE — 1101F PT FALLS ASSESS-DOCD LE1/YR: CPT | Mod: CPTII,S$GLB,, | Performed by: UROLOGY

## 2020-06-08 PROCEDURE — 82306 VITAMIN D 25 HYDROXY: CPT

## 2020-06-08 PROCEDURE — 99999 PR PBB SHADOW E&M-EST. PATIENT-LVL III: ICD-10-PCS | Mod: PBBFAC,,, | Performed by: UROLOGY

## 2020-06-08 PROCEDURE — 1159F MED LIST DOCD IN RCRD: CPT | Mod: S$GLB,,, | Performed by: UROLOGY

## 2020-06-08 PROCEDURE — 80048 BASIC METABOLIC PNL TOTAL CA: CPT

## 2020-06-08 PROCEDURE — 3074F PR MOST RECENT SYSTOLIC BLOOD PRESSURE < 130 MM HG: ICD-10-PCS | Mod: CPTII,S$GLB,, | Performed by: UROLOGY

## 2020-06-08 PROCEDURE — 3079F PR MOST RECENT DIASTOLIC BLOOD PRESSURE 80-89 MM HG: ICD-10-PCS | Mod: CPTII,S$GLB,, | Performed by: UROLOGY

## 2020-06-08 PROCEDURE — 3079F DIAST BP 80-89 MM HG: CPT | Mod: CPTII,S$GLB,, | Performed by: UROLOGY

## 2020-06-08 PROCEDURE — 82365 CALCULUS SPECTROSCOPY: CPT

## 2020-06-08 PROCEDURE — 1126F AMNT PAIN NOTED NONE PRSNT: CPT | Mod: S$GLB,,, | Performed by: UROLOGY

## 2020-06-08 PROCEDURE — 99214 OFFICE O/P EST MOD 30 MIN: CPT | Mod: S$GLB,,, | Performed by: UROLOGY

## 2020-06-08 PROCEDURE — 83970 ASSAY OF PARATHORMONE: CPT

## 2020-06-08 PROCEDURE — 1126F PR PAIN SEVERITY QUANTIFIED, NO PAIN PRESENT: ICD-10-PCS | Mod: S$GLB,,, | Performed by: UROLOGY

## 2020-06-08 PROCEDURE — 1101F PR PT FALLS ASSESS DOC 0-1 FALLS W/OUT INJ PAST YR: ICD-10-PCS | Mod: CPTII,S$GLB,, | Performed by: UROLOGY

## 2020-06-08 PROCEDURE — 36415 COLL VENOUS BLD VENIPUNCTURE: CPT

## 2020-06-08 PROCEDURE — 1159F PR MEDICATION LIST DOCUMENTED IN MEDICAL RECORD: ICD-10-PCS | Mod: S$GLB,,, | Performed by: UROLOGY

## 2020-06-08 RX ORDER — LISINOPRIL 2.5 MG/1
2.5 TABLET ORAL DAILY
COMMUNITY

## 2020-06-08 RX ORDER — GLIPIZIDE 5 MG/1
TABLET ORAL
COMMUNITY
Start: 2011-10-10

## 2020-06-08 NOTE — PROGRESS NOTES
Subjective:      Patient ID: Cesar Oliveira is a 75 y.o. male.    Chief Complaint: kidney stones.    Patient is a 75 y.o. male who is established to our clinic and was initially referred by their PCP, Dr. Gaming for evaluation of gross hematuria and kidney stones.     HPI  Patient underwent right PCNL on 2/8/17.   Stones are calcium oxalate--no stone analysis in 2 years.  Postoperative imaging showed minimal punctate renal stone on the right side from a CT dating 2/9/17.    The patient returns today to discuss a large amount of stone passage over the course of the last 10 months.  He brought 50 very small stones which he has collected since his last appointment with me.  He has passed multiple small stones since last time we saw him.  He passed the stones without significant pain.  Patient doing well.  No gross hematuria.   No dysuria.  No fevers or chills.  No nausea/vomiting.              Review of Systems   All other systems reviewed and negative except pertinent positives noted in HPI.    Objective:     Physical Exam   Constitutional: He is oriented to person, place, and time. He appears well-developed and well-nourished. No distress.   HENT:   Head: Normocephalic and atraumatic.   Eyes: No scleral icterus.   Neck: No tracheal deviation present.   Pulmonary/Chest: Effort normal. No respiratory distress.   Neurological: He is alert and oriented to person, place, and time.   Psychiatric: He has a normal mood and affect. His behavior is normal. Judgment and thought content normal.     Assessment:     1. Kidney stones    2. Increased PTH level    3. Vitamin D deficiency    4. Essential hypertension      Plan:     1. Kidney stone:  -General risk factors for kidney stones and the conservative measures to prevent kidney stones in the future were discussed with the patient in detail.  The patient was encouraged to drink 2-3 liters of water a day, limit iced tea and angela as well as foods high in oxalate.  They were  cautioned to try to limit salt and red meat intake.  We also discussed adding citrate to the diet with the addition of isauro or lemon juice to their water or alternatively with crystal light.     -CT RSS  -24 hour urine ordered---oxalate elevated, urine volume low on prior urine collections  -stone analysis ordered  -PTH elevated, vitamin D low, uric acid normal.  Likely needs vit D supplementation.  -refer to endocrinology.  Severe stone disease with PTH elevated.       -BP reviewed  -stable, continue meds and f/u with PCP

## 2020-06-09 ENCOUNTER — HOSPITAL ENCOUNTER (OUTPATIENT)
Dept: RADIOLOGY | Facility: HOSPITAL | Age: 75
Discharge: HOME | End: 2020-06-09
Attending: UROLOGY
Payer: MEDICARE

## 2020-06-09 DIAGNOSIS — N20.0 KIDNEY STONES: ICD-10-CM

## 2020-06-09 PROCEDURE — 74176 CT RENAL STONE STUDY ABD PELVIS WO: ICD-10-PCS | Mod: 26,,, | Performed by: RADIOLOGY

## 2020-06-09 PROCEDURE — 74176 CT ABD & PELVIS W/O CONTRAST: CPT | Mod: 26,,, | Performed by: RADIOLOGY

## 2020-06-09 PROCEDURE — 74176 CT ABD & PELVIS W/O CONTRAST: CPT | Mod: TC

## 2020-06-10 DIAGNOSIS — N20.0 KIDNEY STONES: Primary | ICD-10-CM

## 2020-06-11 LAB
COMPN STONE: NORMAL
SPECIMEN SOURCE: NORMAL
STONE ANALYSIS IR-IMP: NORMAL

## 2020-06-24 ENCOUNTER — TELEPHONE (OUTPATIENT)
Dept: INTERVENTIONAL RADIOLOGY/VASCULAR | Facility: HOSPITAL | Age: 75
End: 2020-06-24

## 2021-02-11 ENCOUNTER — TELEPHONE (OUTPATIENT)
Dept: UROLOGY | Facility: HOSPITAL | Age: 76
End: 2021-02-11